# Patient Record
Sex: FEMALE | Race: WHITE | NOT HISPANIC OR LATINO | Employment: OTHER | URBAN - METROPOLITAN AREA
[De-identification: names, ages, dates, MRNs, and addresses within clinical notes are randomized per-mention and may not be internally consistent; named-entity substitution may affect disease eponyms.]

---

## 2017-03-14 DIAGNOSIS — I10 HYPERTENSION GOAL BP (BLOOD PRESSURE) < 140/90: ICD-10-CM

## 2017-03-14 NOTE — TELEPHONE ENCOUNTER
amLODIPine (NORVASC) 10 MG tablet      Last Written Prescription Date: 3-8-16  Last Fill Quantity: 90, # refills: 3    Last Office Visit with [facility] or [facility] prescribing provider:  3-8-16   Future Office Visit:        BP Readings from Last 3 Encounters:   03/08/16 106/60   09/15/15 137/63   07/21/15 123/78     quinapril (ACCUPRIL) 20 MG tablet      Last Written Prescription Date: 3-8-16  Last Fill Quantity: 45, # refills: 3  Last Office Visit with [facility] or [facility] prescribing provider: 3-8-16       Potassium   Date Value Ref Range Status   03/08/2016 4.2 3.4 - 5.3 mmol/L Final     Creatinine   Date Value Ref Range Status   03/08/2016 0.62 0.52 - 1.04 mg/dL Final     BP Readings from Last 3 Encounters:   03/08/16 106/60   09/15/15 137/63   07/21/15 123/78

## 2017-03-15 RX ORDER — AMLODIPINE BESYLATE 10 MG/1
10 TABLET ORAL DAILY
Qty: 30 TABLET | Refills: 0 | Status: SHIPPED | OUTPATIENT
Start: 2017-03-15 | End: 2017-06-13

## 2017-03-15 RX ORDER — QUINAPRIL 20 MG/1
10 TABLET ORAL DAILY
Qty: 15 TABLET | Refills: 0 | Status: SHIPPED | OUTPATIENT
Start: 2017-03-15 | End: 2017-06-13

## 2017-03-15 NOTE — TELEPHONE ENCOUNTER
Medication is being filled for 1 time refill only due to:  Patient needs to be seen because it has been more than one year since last visit.    Abby Haile RN   March 15, 2017 2:31 PM  [facility] Triage   [phone]

## 2017-06-05 ENCOUNTER — TELEPHONE (OUTPATIENT)
Dept: FAMILY MEDICINE | Facility: CLINIC | Age: 67
End: 2017-06-05

## 2017-06-05 DIAGNOSIS — Z12.31 ENCOUNTER FOR SCREENING MAMMOGRAM FOR BREAST CANCER: Primary | ICD-10-CM

## 2017-06-05 NOTE — TELEPHONE ENCOUNTER
Reason for Call: Request for an order or referral:    Order or referral being requested: mammogram    Date needed: as soon as possible      Has the patient been seen by the PCP for this problem? YES    Additional comments: Patient needs orders put in to get a mammogram    Phone number Patient can be reached at:  Home number on file [phone] (home)    Best Time:  anytime    Can we leave a detailed message on this number?  YES    Call taken on 6/5/2017 at 3:14 PM by Simona Cordova

## 2017-06-13 ENCOUNTER — TELEPHONE (OUTPATIENT)
Dept: FAMILY MEDICINE | Facility: CLINIC | Age: 67
End: 2017-06-13

## 2017-06-13 DIAGNOSIS — F41.9 ANXIETY: ICD-10-CM

## 2017-06-13 DIAGNOSIS — Z11.59 NEED FOR HEPATITIS C SCREENING TEST: ICD-10-CM

## 2017-06-13 DIAGNOSIS — Z12.11 SCREEN FOR COLON CANCER: ICD-10-CM

## 2017-06-13 DIAGNOSIS — I10 HYPERTENSION GOAL BP (BLOOD PRESSURE) < 140/90: ICD-10-CM

## 2017-06-13 DIAGNOSIS — Z13.6 CARDIOVASCULAR SCREENING; LDL GOAL LESS THAN 130: Primary | ICD-10-CM

## 2017-06-13 RX ORDER — AMLODIPINE BESYLATE 10 MG/1
TABLET ORAL
Qty: 30 TABLET | Refills: 0 | Status: SHIPPED | OUTPATIENT
Start: 2017-06-13 | End: 2017-06-16

## 2017-06-13 RX ORDER — QUINAPRIL 20 MG/1
TABLET ORAL
Qty: 15 TABLET | Refills: 0 | Status: SHIPPED | OUTPATIENT
Start: 2017-06-13 | End: 2017-06-16

## 2017-06-13 NOTE — TELEPHONE ENCOUNTER
amLODIPine (NORVASC) 10 MG tablet      Last Written Prescription Date: 3/15/17  Last Fill Quantity: 30, # refills: 0    Last Office Visit with [facility], [facility] or [facility] prescribing provider:  3/8/16   Future Office Visit:    Next 5 appointments (look out 90 days)     Jul 11, 2017 11:20 AM CDT   SHORT with Dora Fierro MD   [facility] ([facility])    [address] [address]   [phone]                    BP Readings from Last 3 Encounters:   03/08/16 106/60   09/15/15 137/63   07/21/15 123/78       quinapril (ACCUPRIL) 20 MG tablet      Last Written Prescription Date: 3/15/17  Last Fill Quantity: 15, # refills: 0  Last Office Visit with [facility], [facility] or [facility] prescribing provider: 3/8/16  Next 5 appointments (look out 90 days)     Jul 11, 2017 11:20 AM CDT   SHORT with Dora Fierro MD   [facility] ([facility])    [address] [address]   [phone]                   Potassium   Date Value Ref Range Status   03/08/2016 4.2 3.4 - 5.3 mmol/L Final     Creatinine   Date Value Ref Range Status   03/08/2016 0.62 0.52 - 1.04 mg/dL Final     BP Readings from Last 3 Encounters:   03/08/16 106/60   09/15/15 137/63   07/21/15 123/78

## 2017-06-13 NOTE — TELEPHONE ENCOUNTER
cloNIDine (CATAPRES) 0.1 MG tablet      Last Written Prescription Date: 3-8-16  Last Fill Quantity: 270, # refills: 3  Last Office Visit with [facility] or [facility] prescribing provider: 3-8-16  Next 5 appointments (look out 90 days)     Jul 11, 2017 11:20 AM CDT   SHORT with Dora Fierro MD   [facility] ([facility])    [address] [place] [address] [phone]                   Potassium   Date Value Ref Range Status   03/08/2016 4.2 3.4 - 5.3 mmol/L Final     Creatinine   Date Value Ref Range Status   03/08/2016 0.62 0.52 - 1.04 mg/dL Final     BP Readings from Last 3 Encounters:   03/08/16 106/60   09/15/15 137/63   07/21/15 123/78

## 2017-06-13 NOTE — TELEPHONE ENCOUNTER
Limited refill given.  Patient overdue for labs and follow up    I see she has 7/11 appt.  Please offer to schedule lab appt prior so we can review results at time of visit

## 2017-06-14 RX ORDER — CLONIDINE HYDROCHLORIDE 0.1 MG/1
0.1 TABLET ORAL 3 TIMES DAILY
Qty: 270 TABLET | Refills: 3 | Status: SHIPPED | OUTPATIENT
Start: 2017-06-14 | End: 2018-07-10

## 2017-06-14 NOTE — TELEPHONE ENCOUNTER
TC contacted patient and scheduled lab visit on 06/20/17. TC discussed with patient regarding changing her appointment on 07/11/17 to an annual physical as it's been awhile. Patient is willing to do this but there is not enough time to do physical at that time. Patient can reschedule that appointment if needed to later in the month but then would need additional refills prior to appointment. Patient also stated that she needs refills on her sertraline and xanax which have not been prescribed since 06/2015. TC advised patient that Dr Fierro will probably want to see her in clinic prior to refilling these medications. Routing to provider to address additional lab orders for physical and whether physical can still be done at scheduled appt on 07/11 or if patient should reschedule. Please contact patient to clarify appointment and refill information once reviewed by provider.

## 2017-06-15 NOTE — TELEPHONE ENCOUNTER
R/s her for next avail AFE .     It the math shows another month is needed for one or more of meds, then cue up please

## 2017-06-16 RX ORDER — SERTRALINE HYDROCHLORIDE 25 MG/1
25 TABLET, FILM COATED ORAL DAILY
Qty: 90 TABLET | Refills: 3 | Status: SHIPPED | OUTPATIENT
Start: 2017-06-16 | End: 2018-07-10

## 2017-06-16 RX ORDER — ALPRAZOLAM 0.25 MG
0.25 TABLET ORAL 3 TIMES DAILY PRN
Qty: 90 TABLET | Refills: 0 | Status: SHIPPED | OUTPATIENT
Start: 2017-06-16 | End: 2017-11-08

## 2017-06-16 RX ORDER — AMLODIPINE BESYLATE 10 MG/1
10 TABLET ORAL DAILY
Qty: 30 TABLET | Refills: 0 | Status: SHIPPED | OUTPATIENT
Start: 2017-06-16 | End: 2017-07-18

## 2017-06-16 RX ORDER — QUINAPRIL 20 MG/1
TABLET ORAL
Qty: 15 TABLET | Refills: 0 | Status: SHIPPED | OUTPATIENT
Start: 2017-06-16 | End: 2017-07-18

## 2017-06-16 NOTE — TELEPHONE ENCOUNTER
Patient rescheduled appointment for annual physical on 07/18/17. Provider needs to order labs needed for physical. Patient having labs drawn on 06/20/17. Patient will need refills prior to appointment.  amLODIPine (NORVASC) 10 MG tablet      Last Written Prescription Date: 06/13/2017  Last Fill Quantity: 30, # refills: 0  Last Office Visit with [email or web address] or [facility] prescribing provider: 03/08/2016  Next 5 appointments (look out 90 days)     Jul 18, 2017 11:00 AM CDT   PHYSICAL with Dora Fierro MD   [facility]    [address] [place] [address]   [phone]                   Potassium   Date Value Ref Range Status   03/08/2016 4.2 3.4 - 5.3 mmol/L Final     Creatinine   Date Value Ref Range Status   03/08/2016 0.62 0.52 - 1.04 mg/dL Final     BP Readings from Last 3 Encounters:   03/08/16 106/60   09/15/15 137/63   07/21/15 123/78     quinapril (ACCUPRIL) 20 MG tablet      Last Written Prescription Date: 06/13/2017  Last Fill Quantity: 15, # refills: 0  Last Office Visit with [facility], [email or web address] or [facility] prescribing provider: 03/08/2016  Next 5 appointments (look out 90 days)     Jul 18, 2017 11:00 AM CDT   PHYSICAL with Dora Fierro MD   [facility]    [address] [place] [address]   [phone]                   Potassium   Date Value Ref Range Status   03/08/2016 4.2 3.4 - 5.3 mmol/L Final     Creatinine   Date Value Ref Range Status   03/08/2016 0.62 0.52 - 1.04 mg/dL Final     BP Readings from Last 3 Encounters:   03/08/16 106/60   09/15/15 137/63   07/21/15 123/78     sertraline (ZOLOFT) 25 MG tablet     Last Written Prescription Date: 06/16/2015  Last Fill Quantity: 90, # refills: 3  Last Office Visit with [facility] primary care provider:  03/08/2016   Next 5 appointments (look out 90 days)     Jul 18, 2017 11:00 AM CDT   PHYSICAL  with Dora Fierro MD   [facility]    [address] [facility] [address]   [phone]                   Last PHQ-9 score on record= No flowsheet data found.    ALPRAZolam (XANAX) 0.25 MG tablet      Last Written Prescription Date:  06/16/2015  Last Fill Quantity: 90,   # refills: 3  Last Office Visit with [facility] or [facility] prescribing provider: 03/08/2016  Future Office visit:    Next 5 appointments (look out 90 days)     Jul 18, 2017 11:00 AM CDT   PHYSICAL with Dora Fierro MD   [facility] ([facility]    [address] [address]   [phone]                   Routing refill request to provider for review/approval because:  Drug not on the [facility], [facility] or [facility] refill protocol or controlled substance

## 2017-06-20 DIAGNOSIS — Z13.6 CARDIOVASCULAR SCREENING; LDL GOAL LESS THAN 130: ICD-10-CM

## 2017-06-20 DIAGNOSIS — I10 HYPERTENSION GOAL BP (BLOOD PRESSURE) < 140/90: ICD-10-CM

## 2017-06-20 DIAGNOSIS — Z11.59 NEED FOR HEPATITIS C SCREENING TEST: Primary | ICD-10-CM

## 2017-06-20 DIAGNOSIS — E78.5 HYPERLIPIDEMIA LDL GOAL <100: ICD-10-CM

## 2017-06-20 LAB
ANION GAP SERPL CALCULATED.3IONS-SCNC: 10 MMOL/L (ref 3–14)
BUN SERPL-MCNC: 10 MG/DL (ref 7–30)
CALCIUM SERPL-MCNC: 9.1 MG/DL (ref 8.5–10.1)
CHLORIDE SERPL-SCNC: 107 MMOL/L (ref 94–109)
CHOLEST SERPL-MCNC: 201 MG/DL
CO2 SERPL-SCNC: 26 MMOL/L (ref 20–32)
CREAT SERPL-MCNC: 0.62 MG/DL (ref 0.52–1.04)
GFR SERPL CREATININE-BSD FRML MDRD: NORMAL ML/MIN/1.7M2
GLUCOSE SERPL-MCNC: 88 MG/DL (ref 70–99)
HDLC SERPL-MCNC: 75 MG/DL
LDLC SERPL CALC-MCNC: 117 MG/DL
NONHDLC SERPL-MCNC: 126 MG/DL
POTASSIUM SERPL-SCNC: 3.7 MMOL/L (ref 3.4–5.3)
SODIUM SERPL-SCNC: 143 MMOL/L (ref 133–144)
TRIGL SERPL-MCNC: 44 MG/DL

## 2017-06-20 PROCEDURE — 86803 HEPATITIS C AB TEST: CPT | Performed by: INTERNAL MEDICINE

## 2017-06-20 PROCEDURE — 80048 BASIC METABOLIC PNL TOTAL CA: CPT | Performed by: INTERNAL MEDICINE

## 2017-06-20 PROCEDURE — 36415 COLL VENOUS BLD VENIPUNCTURE: CPT | Performed by: INTERNAL MEDICINE

## 2017-06-20 PROCEDURE — 80061 LIPID PANEL: CPT | Performed by: INTERNAL MEDICINE

## 2017-06-20 NOTE — LETTER
[facility]  [address] [place]  [address]  [phone]        June 21, 2017    Holly Reina  [address]   [place] [address]        Dear Holly,    Enclosed are your results.  Your labs are normal/stable at this time.     Please call  with any questions.  We can also discuss any questions regarding these labs at your next scheduled visit.    Results for orders placed or performed in visit on 06/20/17   Hepatitis C antibody   Result Value Ref Range    Hepatitis C Antibody  NR     Nonreactive   Assay performance characteristics have not been established for newborns,   infants, and children     **Lipid panel reflex to direct LDL FUTURE anytime   Result Value Ref Range    Cholesterol 201 (H) <200 mg/dL    Triglycerides 44 <150 mg/dL    HDL Cholesterol 75 >49 mg/dL    LDL Cholesterol Calculated 117 (H) <100 mg/dL    Non HDL Cholesterol 126 <130 mg/dL   Basic metabolic panel   Result Value Ref Range    Sodium 143 133 - 144 mmol/L    Potassium 3.7 3.4 - 5.3 mmol/L    Chloride 107 94 - 109 mmol/L    Carbon Dioxide 26 20 - 32 mmol/L    Anion Gap 10 3 - 14 mmol/L    Glucose 88 70 - 99 mg/dL    Urea Nitrogen 10 7 - 30 mg/dL    Creatinine 0.62 0.52 - 1.04 mg/dL    GFR Estimate >90  Non  GFR Calc   >60 mL/min/1.7m2    GFR Estimate If Black >90   GFR Calc   >60 mL/min/1.7m2    Calcium 9.1 8.5 - 10.1 mg/dL       If you have any questions please call the clinic at [phone].    Sincerely,    Dora MCALLISTER

## 2017-06-21 LAB — HCV AB SERPL QL IA: NORMAL

## 2017-06-21 NOTE — PROGRESS NOTES
Holly Gomez are your results.  Your labs are normal/stable at this time.     Please call  with any questions.  We can also discuss any questions regarding these labs at your next scheduled visit.    Sincerely,    Dora Fierro M.D.

## 2017-07-13 ENCOUNTER — TELEPHONE (OUTPATIENT)
Dept: FAMILY MEDICINE | Facility: CLINIC | Age: 67
End: 2017-07-13

## 2017-07-13 NOTE — TELEPHONE ENCOUNTER
----- Message from Adam Bermudez sent at 7/13/2017  4:59 PM CDT -----  Regarding: denial  DOS 6/20/17 Coding [place] [id number] being done for screening to frequently. Last one on in March. In reviewing the March result it appears the pt had an elevated test. Pehaps that is why the test was repeated so soon. If yes the dx should be changed to a dx for high lipid or abnormal lab result and insurance would likely cover this lab. Can you review the lab and if anything can be changed have this corrected in documentation and order. If you have any questions in regards to this please let me know.

## 2017-07-18 ENCOUNTER — OFFICE VISIT (OUTPATIENT)
Dept: FAMILY MEDICINE | Facility: CLINIC | Age: 67
End: 2017-07-18
Payer: COMMERCIAL

## 2017-07-18 VITALS
HEART RATE: 65 BPM | HEIGHT: 61 IN | BODY MASS INDEX: 18.12 KG/M2 | WEIGHT: 96 LBS | DIASTOLIC BLOOD PRESSURE: 63 MMHG | OXYGEN SATURATION: 98 % | SYSTOLIC BLOOD PRESSURE: 114 MMHG | TEMPERATURE: 97.9 F

## 2017-07-18 DIAGNOSIS — M41.25 OTHER IDIOPATHIC SCOLIOSIS, THORACOLUMBAR REGION: ICD-10-CM

## 2017-07-18 DIAGNOSIS — R42 INTERMITTENT VERTIGO: ICD-10-CM

## 2017-07-18 DIAGNOSIS — Z00.00 ROUTINE GENERAL MEDICAL EXAMINATION AT A HEALTH CARE FACILITY: Primary | ICD-10-CM

## 2017-07-18 DIAGNOSIS — M81.0 AGE-RELATED OSTEOPOROSIS WITHOUT CURRENT PATHOLOGICAL FRACTURE: ICD-10-CM

## 2017-07-18 DIAGNOSIS — M81.0 OSTEOPOROSIS WITHOUT CURRENT PATHOLOGICAL FRACTURE, UNSPECIFIED OSTEOPOROSIS TYPE: ICD-10-CM

## 2017-07-18 DIAGNOSIS — M48.061 SPINAL STENOSIS OF LUMBAR REGION: ICD-10-CM

## 2017-07-18 DIAGNOSIS — Z12.11 SCREEN FOR COLON CANCER: ICD-10-CM

## 2017-07-18 DIAGNOSIS — Z12.31 VISIT FOR SCREENING MAMMOGRAM: ICD-10-CM

## 2017-07-18 DIAGNOSIS — J45.20 MILD INTERMITTENT ASTHMA WITHOUT COMPLICATION: ICD-10-CM

## 2017-07-18 DIAGNOSIS — I10 HYPERTENSION GOAL BP (BLOOD PRESSURE) < 140/90: ICD-10-CM

## 2017-07-18 DIAGNOSIS — Q35.9 CLEFT PALATE: ICD-10-CM

## 2017-07-18 DIAGNOSIS — M26.04 MANDIBULAR MICROGNATHIA: ICD-10-CM

## 2017-07-18 DIAGNOSIS — G57.00 LESION OF SCIATIC NERVE, UNSPECIFIED LATERALITY: ICD-10-CM

## 2017-07-18 PROCEDURE — 99397 PER PM REEVAL EST PAT 65+ YR: CPT | Performed by: INTERNAL MEDICINE

## 2017-07-18 PROCEDURE — 99213 OFFICE O/P EST LOW 20 MIN: CPT | Mod: 25 | Performed by: INTERNAL MEDICINE

## 2017-07-18 RX ORDER — ALENDRONATE SODIUM 70 MG/1
70 TABLET ORAL
Qty: 12 TABLET | Refills: 3 | Status: SHIPPED | OUTPATIENT
Start: 2017-07-18 | End: 2019-10-15

## 2017-07-18 RX ORDER — QUINAPRIL 20 MG/1
TABLET ORAL
Qty: 45 TABLET | Refills: 3 | Status: SHIPPED | OUTPATIENT
Start: 2017-07-18 | End: 2018-12-11

## 2017-07-18 RX ORDER — MECLIZINE HYDROCHLORIDE 25 MG/1
25 TABLET ORAL EVERY 6 HOURS PRN
Qty: 24 TABLET | Refills: 1 | Status: SHIPPED | OUTPATIENT
Start: 2017-07-18 | End: 2018-07-10

## 2017-07-18 RX ORDER — AMLODIPINE BESYLATE 10 MG/1
10 TABLET ORAL DAILY
Qty: 90 TABLET | Refills: 3 | Status: SHIPPED | OUTPATIENT
Start: 2017-07-18 | End: 2018-08-28

## 2017-07-18 NOTE — PROGRESS NOTES
SUBJECTIVE:   Holly Reina is a 66 year old female who presents for Preventive Visit.    65 y/o F with anxiety, cleft palate. mild intermittent asthma here for routing AFE.   Recent labs look good. Normal BMP and cholesterol WNL.   Never did start fosamax  Are you in the first 12 months of your Medicare Part B coverage?  No    Healthy Habits:    Do you get at least three servings of calcium containing foods daily (dairy, green leafy vegetables, etc.)? yes    Amount of exercise or daily activities, outside of work: 0 day(s) per week    Problems taking medications regularly No    Medication side effects: No    Have you had an eye exam in the past two years? no    Do you see a dentist twice per year? no    Do you have sleep apnea, excessive snoring or daytime drowsiness?yes-daytime drowsiness    COGNITIVE SCREEN  1) Repeat 3 items (Banana, Sunrise, Chair)    2) Clock draw: NORMAL  3) 3 item recall: Recalls 2 objects   Results: NORMAL clock, 1-2 items recalled: COGNITIVE IMPAIRMENT LESS LIKELY    Mini-CogTM Copyright S Mikki. Licensed by the author for use in [facility]; reprinted with permission (savage@[facility]). All rights reserved.            Letter that states she should live in a building with an elevator, so she can brake her lease  Anxieyy    Reviewed and updated as needed this visit by clinical staff  Tobacco  Allergies  Med Hx  Surg Hx  Fam Hx  Soc Hx        Reviewed and updated as needed this visit by Provider        Social History   Substance Use Topics     Smoking status: Former Smoker     Smokeless tobacco: Never Used     Alcohol use Yes      Comment: seldom       The patient does not drink >3 drinks per day nor >7 drinks per week.    Today's PHQ-2 Score:   PHQ-2 ( 1999 [employer]) 3/8/2016 9/15/2015   Q1: Little interest or pleasure in doing things 0 1   Q2: Feeling down, depressed or hopeless 0 1   PHQ-2 Score 0 2     PHQ 2 = 0  Do you feel safe in your environment - Yes    Do you have a  Health Care Directive?: No: Advance care planning reviewed with patient; information given to patient to review.    Current providers sharing in care for this patient include:   Patient Care Team:  Dora Fierro MD as PCP - General      Hearing impairment: No    Ability to successfully perform activities of daily living: No, needs assistance with: housework     Fall risk:       Home safety:  lack of grab bars in the bathroom      The following health maintenance items are reviewed in Epic and correct as of today:  Health Maintenance   Topic Date Due     PNEUMOCOCCAL (2 of 2 - PPSV23) 06/16/2016     FALL RISK ASSESSMENT  07/21/2016     ADVANCE DIRECTIVE PLANNING Q5 YRS  07/25/2016     ASTHMA CONTROL TEST Q6 MOS  09/08/2016     MAMMO SCREEN Q2 YR (SYSTEM ASSIGNED)  02/03/2017     ASTHMA ACTION PLAN Q1 YR  03/08/2017     FIT Q1 YR  03/15/2017     INFLUENZA VACCINE (SYSTEM ASSIGNED)  09/01/2017     BMP Q1 YR  06/20/2018     LIPID MONITORING Q1 YEAR  06/20/2018     WELLNESS VISIT Q1 YR  07/18/2018     TETANUS IMMUNIZATION (SYSTEM ASSIGNED)  07/25/2021     DEXA SCAN SCREENING (SYSTEM ASSIGNED)  Completed     HEPATITIS C SCREENING  Completed     Labs reviewed in EPIC  BP Readings from Last 3 Encounters:   07/18/17 114/63   03/08/16 106/60   09/15/15 137/63    Wt Readings from Last 3 Encounters:   07/18/17 96 lb (43.5 kg)   03/08/16 96 lb (43.5 kg)   12/22/15 90 lb (40.8 kg)                  Patient Active Problem List   Diagnosis     CARDIOVASCULAR SCREENING; LDL GOAL LESS THAN 130     Atypical angina (H)     Hypertension goal BP (blood pressure) < 140/90     ASCUS on Pap smear     Advanced directives, counseling/discussion     Scoliosis deformity of spine     Spinal stenosis     Anxiety     Osteoporosis     Gastroesophageal reflux disease without esophagitis     Fracture of distal end of radius and ulna, right, closed, with routine healing, subsequent encounter     Mild intermittent asthma without complication      Past Surgical History:   Procedure Laterality Date     C JACOB NOSE/CLEFT LIP/TIP  childhood     DECOMPRESSION LUMBAR ONE LEVEL  2002    l4/5  -- Dr Mckinley     HC DILATION/CURETTAGE DIAG/THER NON OB       HYSTERECTOMY, CERVIX STATUS UNKNOWN         Social History   Substance Use Topics     Smoking status: Former Smoker     Smokeless tobacco: Never Used     Alcohol use Yes      Comment: seldom     Family History   Problem Relation Age of Onset     HEART DISEASE Mother      Hypertension Mother      HEART DISEASE Father      Hypertension Father          Current Outpatient Prescriptions   Medication Sig Dispense Refill     amLODIPine (NORVASC) 10 MG tablet Take 1 tablet (10 mg) by mouth daily 30 tablet 0     quinapril (ACCUPRIL) 20 MG tablet TAKE ONE-HALF TABLET BY MOUTH DAILY 15 tablet 0     sertraline (ZOLOFT) 25 MG tablet Take 1 tablet (25 mg) by mouth daily 1 tablet.qd 90 tablet 3     ALPRAZolam (XANAX) 0.25 MG tablet Take 1 tablet (0.25 mg) by mouth 3 times daily as needed for anxiety 90 tablet 0     cloNIDine (CATAPRES) 0.1 MG tablet Take 1 tablet (0.1 mg) by mouth 3 times daily 270 tablet 3     atenolol (TENORMIN) 50 MG tablet Take 1 tablet (50 mg) by mouth daily 90 tablet 3     aspirin 81 MG tablet Take 81 mg by mouth daily       loratadine 10 MG capsule Take 10 mg by mouth daily 90 capsule 3     meclizine (ANTIVERT) 25 MG tablet Take 1 tablet (25 mg) by mouth every 6 hours as needed 24 tablet 1     mometasone (NASONEX) 50 MCG/ACT nasal spray Spray 2 sprays into both nostrils daily. 3 Box 3     calcium carbonate (TUMS E-X 750) 750 MG CHEW Take 750 mg by mouth daily.       Multiple Vitamins-Minerals (ONE-A-DAY WOMENS 50 PLUS PO) Take  by mouth daily.       STATIN NOT PRESCRIBED, INTENTIONAL, Please choose reason not prescribed, below       alendronate (FOSAMAX) 70 MG tablet Take 1 tablet (70 mg) by mouth every 7 days Take with over 8 ounces water and stay upright for at least 30 minutes after dose.  Take at  least 60 minutes before breakfast (Patient not taking: Reported on 7/18/2017) 12 tablet 3     ibuprofen (ADVIL,MOTRIN) 200 MG tablet Take 1 tablet by mouth every 4 hours as needed for pain. (Patient not taking: Reported on 7/18/2017) 60 tablet 0     albuterol 90 MCG/ACT inhaler Inhale 1-2 puffs into the lungs every 6 hours as needed for shortness of breath / dyspnea. (Patient not taking: Reported on 7/18/2017) 1 Inhaler 4     Allergies   Allergen Reactions     Citalopram Other (See Comments)     Dry mouth, pressures in chest  And nose congestion. Patient also had light headed and nausea     Penicillins      Pravastatin      Aches.      Triamterene-Hctz [Hydrochlorothiazide W/Triamterene]      Recent Labs   Lab Test  06/20/17   1105  03/08/16   1104   06/16/15   1133   06/20/14   1227   09/20/12   1034   09/22/11   1109   LDL  117*  116*   --   94   --   120   --   128   --   125   HDL  75  79   --   70   --   56   --   69   --   54   TRIG  44  53   --   68   --   52   --   64   --   74   ALT   --    --    --    --    --    --    --   17   --   19   CR  0.62  0.62   < >  0.60   < >  0.60   < >  0.54   < >   --    GFRESTIMATED  >90  Non  GFR Calc    >90  Non  GFR Calc     < >  >90  Non  GFR Calc     < >  >90   < >  >90   < >   --    GFRESTBLACK  >90   GFR Calc    >90   GFR Calc     < >  >90   GFR Calc     < >  >90   < >  >90   < >   --    POTASSIUM  3.7  4.2   < >  3.9   < >  3.9   < >  4.4   < >   --    TSH   --   1.07   --    --    --   1.30   < >   --    < >   --     < > = values in this interval not displayed.             ROS:  Constitutional (no longer depressed, feels she is taking control of her life), HEENT, cardiovascular, pulmonary, GI, , musculoskeletal, neuro, skin, endocrine and psych systems are negative, except as otherwise noted.    OBJECTIVE:   /63 (BP Location: Right arm, Patient Position: Chair,  "Cuff Size: Adult Small)  Pulse 65  Temp 97.9  F (36.6  C) (Oral)  Ht 5' 0.5\" (1.537 m)  Wt 96 lb (43.5 kg)  SpO2 98%  BMI 18.44 kg/m2 Estimated body mass index is 18.44 kg/(m^2) as calculated from the following:    Height as of this encounter: 5' 0.5\" (1.537 m).    Weight as of this encounter: 96 lb (43.5 kg).  EXAM:   GENERAL APPEARANCE: healthy, alert and no distress  EYES: Eyes grossly normal to inspection, PERRL and conjunctivae and sclerae normal  HENT: ear canals and TM's normal, nose and mouth without ulcers or lesions, oropharynx clear and oral mucous membranes moist  HENT: micrognathia and midline cleft palate.  Nasal, resonant voice.  Fair dentition.   NECK: no adenopathy, no asymmetry, masses, or scars and thyroid normal to palpation  RESP: lungs clear to auscultation - no rales, rhonchi or wheezes  BREAST: normal without masses, tenderness or nipple discharge and no palpable axillary masses or adenopathy  CV: regular rate and rhythm, normal S1 S2, no S3 or S4, no murmur, click or rub, no peripheral edema and peripheral pulses strong  ABDOMEN: soft, nontender, no hepatosplenomegaly, no masses and bowel sounds normal   (female): normal female external genitalia, normal urethral meatus and no bimanual done  MS: no musculoskeletal defects are noted and gait is age appropriate without ataxia.  Lower lumbar scar from h/o lumbar surgery  SKIN: no suspicious lesions or rashes  NEURO: Normal strength and tone, sensory exam grossly normal, mentation intact and speech normal.  Slight positive straight leg raise on left.  Slight weakness of toe dorsiflexion on left.  This is stable   PSYCH: mentation appears normal and affect normal/bright.  Pressured speech.  Positive outlook.     ASSESSMENT / PLAN:       ICD-10-CM    1. Routine general medical examination at a health care facility Z00.00    2. Mild intermittent asthma without complication J45.20    3. Age-related osteoporosis without current pathological " "fracture M81.0    4. Other idiopathic scoliosis, thoracolumbar region M41.25    5. Spinal stenosis of lumbar region M48.06    6. Hypertension goal BP (blood pressure) < 140/90 I10 amLODIPine (NORVASC) 10 MG tablet     quinapril (ACCUPRIL) 20 MG tablet   7. 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 Cleft palate Q35.9    9. Screen for colon cancer Z12.11    10. Visit for screening mammogram Z12.31 MA SCREENING DIGITAL BILAT - Future  (s+30)   11. Osteoporosis without current pathological fracture, unspecified osteoporosis type M81.0 alendronate (FOSAMAX) 70 MG tablet   12. Intermittent vertigo R42 meclizine (ANTIVERT) 25 MG tablet     [facility]   13. Lesion of sciatic nerve, unspecified laterality G57.00 MR Lumbar Spine w/o Contrast     [facility]          Patient needs multidisciplinary support/treatment for her sciatica...   Be careful with any sedating medications.  She may respond to therapy and / or injections.  Would ask pain colleagues to refer her to neurosurgery when/if appropriate in future.     Letter composed and  printed for her landlord, she may have to break her current lease. She is moving to a senior building soon.     Asthma well controlled.          End of Life Planning:  Patient currently has an advanced directive: No.  I have verified the patient's ablity to prepare an advanced directive/make health care decisions.  Literature was provided to assist patient in preparing an advanced directive.    COUNSELING:  Reviewed preventive health counseling, as reflected in patient instructions       Advanced Planning         Estimated body mass index is 18.44 kg/(m^2) as calculated from the following:    Height as of this encounter: 5' 0.5\" (1.537 m).    Weight as of this encounter: 96 lb (43.5 kg).  Weight management plan noted, stable and monitoring   reports that she has quit smoking. She has never used smokeless tobacco.      Appropriate " preventive services were discussed with this patient, including applicable screening as appropriate for cardiovascular disease, diabetes, osteopenia/osteoporosis, and glaucoma.  As appropriate for age/gender, discussed screening for colorectal cancer, prostate cancer, breast cancer, and cervical cancer. Checklist reviewing preventive services available has been given to the patient.    Reviewed patients plan of care and provided an AVS. The Basic Care Plan (routine screening as documented in Health Maintenance) for Holly meets the Care Plan requirement. This Care Plan has been established and reviewed with the Patient.    Counseling Resources:  ATP IV Guidelines  Pooled Cohorts Equation Calculator  Breast Cancer Risk Calculator  FRAX Risk Assessment  ICSI Preventive Guidelines  Dietary Guidelines for Americans, 2010  USDA's MyPlate  ASA Prophylaxis  Lung CA Screening    Dora Fierro MD  [facility]

## 2017-07-18 NOTE — LETTER
[address] [address] [phone]      July 18, 2017      RE:   Holly SHELTON Super  [address]   [place] [address]      To Whom It May Concern,       Due to mobility problems Holly must live in a building with an elevator is she is living above the main floor.       Your understanding is appreciated.         Sincerely,        SANTIAGO MORLEY M.D.  [phone] ()  [phone] (fax)

## 2017-07-18 NOTE — PATIENT INSTRUCTIONS

## 2017-07-18 NOTE — MR AVS SNAPSHOT
After Visit Summary   7/18/2017    Holly Reina    MRN: [id number]           Patient Information     Date Of Birth          1950        Visit Information        Provider Department      7/18/2017 11:00 AM Dora Fierro MD [facility]        Today's Diagnoses     Routine general medical examination at a health care facility    -  1    Mild intermittent asthma without complication        Age-related osteoporosis without current pathological fracture        Other idiopathic scoliosis, thoracolumbar region        Spinal stenosis of lumbar region        Hypertension goal BP (blood pressure) < 140/90        Mandibular micrognathia        Cleft palate        Screen for colon cancer        Visit for screening mammogram        Osteoporosis without current pathological fracture, unspecified osteoporosis type        Intermittent vertigo        Lesion of sciatic nerve, unspecified laterality          Care Instructions      Preventive Health Recommendations    Female Ages 65 +    Yearly exam:     See your health care provider every year in order to  o Review health changes.   o Discuss preventive care.    o Review your medicines if your doctor has prescribed any.      You no longer need a yearly Pap test unless you've had an abnormal Pap test in the past 10 years. If you have vaginal symptoms, such as bleeding or discharge, be sure to talk with your provider about a Pap test.      Every 1 to 2 years, have a mammogram.  If you are over 69, talk with your health care provider about whether or not you want to continue having screening mammograms.      Every 10 years, have a colonoscopy. Or, have a yearly FIT test (stool test). These exams will check for colon cancer.       Have a cholesterol test every 5 years, or more often if your doctor advises it.       Have a diabetes test (fasting glucose) every three years. If you are at risk for diabetes, you should have this test more  often.       At age 65, have a bone density scan (DEXA) to check for osteoporosis (brittle bone disease).    Shots:    Get a flu shot each year.    Get a tetanus shot every 10 years.    Talk to your doctor about your pneumonia vaccines. There are now two you should receive - Pneumovax (PPSV 23) and Prevnar (PCV 13).    Talk to your doctor about the shingles vaccine.    Talk to your doctor about the hepatitis B vaccine.    Nutrition:     Eat at least 5 servings of fruits and vegetables each day.      Eat whole-grain bread, whole-wheat pasta and brown rice instead of white grains and rice.      Talk to your provider about Calcium and Vitamin D.     Lifestyle    Exercise at least 150 minutes a week (30 minutes a day, 5 days a week). This will help you control your weight and prevent disease.      Limit alcohol to one drink per day.      No smoking.       Wear sunscreen to prevent skin cancer.       See your dentist twice a year for an exam and cleaning.      See your eye doctor every 1 to 2 years to screen for conditions such as glaucoma, macular degeneration and cataracts.      Start the Fosamax once weekly (to reduce fracture risk)    mammogram    Health Care Directive -- we would like one on file    Call to schedule imaging   :   MRI back.  This will help pain clinic plan treatments.     Pain clinic will call you    Return to clinic 3-6 months (Fall 2017)          Follow-ups after your visit        Additional Services     PAIN MANAGEMENT CENTER ([place]) REFERRAL       Your provider has referred you to the [facility].    Reason for Referral: Comprehensive Evaluation and Management    Please complete the following questions:    What is your diagnosis for the patient's pain? Sciatica.  Patient needs multidisciplinary support/treatment.  Be careful with any sedating medications.  She may respond to therapy and / or injections.  Please refer to neurosurgery when/if appropriate in  future.     Do you have any specific questions for the pain specialist? No    Are there any red flags that may impact the assessment or management of the patient? None    **ANY DIAGNOSTIC TESTS THAT ARE NOT IN EPIC SHOULD BE SENT TO THE PAIN CENTER**    Please note the Pre-Op Pain Consult must be scheduled 2-3 weeks prior to the patient's surgery.  Patient's trying to schedule within 2 weeks of surgery may not be accommodated.     Pre-Op Pain Consults are only good for 30 days.    REGARDING OPIOID MEDICATIONS:  We will always address appropriateness of opioid pain medications, but we generally will not automatically take on a prescribing role. When we do take on prescribing of opioids for chronic pain, it is in collaboration with the referring physician for an intermediate period of time (months), with an expectation that the primary physician or provider will assume the prescribing role if medications are effective at stable doses with demonstrated compliance.  Therefore, please do not assume that your prescribing responsibilities end on the day of pain clinic consultation.  Is this agreeable to you? YES    For any questions, contact the [facility] at [phone].    Please be aware that coverage of these services is subject to the terms and limitations of your health insurance plan.  Call member services at your health plan with any benefit or coverage questions.      Please bring the following with you to your appointment:    (1) Any X-Rays, CTs or MRIs which have been performed.  Contact the facility where they were done to arrange for  prior to your scheduled appointment.    (2) List of current medications   (3) This referral request   (4) Any documents/labs given to you for this referral                  Follow-up notes from your care team     Return in about 4 months (around 11/18/2017) for Routine Visit.      Your next 10 appointments already scheduled     Jul 31, 2017  2:30 PM  "CDT   MA SCREENING DIGITAL BILATERAL with [id number]   [facility]    [address] [place] [address]  [place] [address] [phone]           Do not use any powder, lotion or deodorant under your arms or on your breast. If you do, we will ask you to remove it before your exam.  Wear comfortable, two-piece clothing.  If you have any allergies, tell your care team.  Bring any previous mammograms from other facilities or have them mailed to the breast center.              Future tests that were ordered for you today     Open Future Orders        Priority Expected Expires Ordered    Fecal colorectal cancer screen (FIT) Routine 8/8/2017 10/10/2017 7/18/2017    MA SCREENING DIGITAL BILAT - Future  (s+30) Routine  7/18/2018 7/18/2017    MR Lumbar Spine w/o Contrast Routine  7/18/2018 7/18/2017            Who to contact     If you have questions or need follow up information about today's clinic visit or your schedule please contact [facility] directly at [phone].  Normal or non-critical lab and imaging results will be communicated to you by [employer] System[employer]art, letter or phone within 4 business days after the clinic has received the results. If you do not hear from us within 7 days, please contact the clinic through [employer] System[employer] or phone. If you have a critical or abnormal lab result, we will notify you by phone as soon as possible.  Submit refill requests through [employer] or call your pharmacy and they will forward the refill request to us. Please allow 3 business days for your refill to be completed.          Additional Information About Your Visit        Opendisc Information     Opendisc lets you send messages to your doctor, view your test results, renew your prescriptions, schedule appointments and more. To sign up, go to [email or web address] . Click on \"Log in\" on the left side of the screen, which will take you to the Welcome page. Then click on \"Sign up Now\" on the " "right side of the page.     You will be asked to enter the access code listed below, as well as some personal information. Please follow the directions to create your username and password.     Your access code is: [email or web address]  Expires: 10/16/2017 12:22 PM     Your access code will  in 90 days. If you need help or a new code, please call your [facility] clinic or [phone].        Care EveryWhere ID     This is your Care EveryWhere ID. This could be used by other organizations to access your [facility] medical records  [id number]        Your Vitals Were     Pulse Temperature Height Pulse Oximetry BMI (Body Mass Index)       65 97.9  F (36.6  C) (Oral) 5' 0.5\" (1.537 m) 98% 18.44 kg/m2        Blood Pressure from Last 3 Encounters:   17 114/63   16 106/60   09/15/15 137/63    Weight from Last 3 Encounters:   17 96 lb (43.5 kg)   16 96 lb (43.5 kg)   12/22/15 90 lb (40.8 kg)              We Performed the Following     Asthma Action Plan (AAP)     OFFICE/OUTPT VISIT,ELSA SANTIAGO IV     PAIN MANAGEMENT CENTER ([place]) REFERRAL          Where to get your medicines      These medications were sent to [facility] [address] [place], [place] [address] [facility] [place] [address] [facility] [address]     Phone:  [phone]     amLODIPine 10 MG tablet    meclizine 25 MG tablet    quinapril 20 MG tablet         Some of these will need a paper prescription and others can be bought over the counter.  Ask your nurse if you have questions.     Bring a paper prescription for each of these medications     alendronate 70 MG tablet          Primary Care Provider Office Phone # Fax #    Dora Fierro [phone]       [facility] [address] [facility] [place] [address]        Equal Access to Services     PADMINI RALPH AH: Yana Steiner, joseph snyder, divya samsonarash " lanick barker. So [facility] [phone].    ATENCIÓN: Si habla isela, tiene a amezquita disposición servicios gratuitos de asistencia lingüística. Clive shaffer [phone].    We comply with applicable federal civil rights laws and [place] laws. We do not discriminate on the basis of race, color, national origin, age, disability sex, sexual orientation or gender identity.            Thank you!     Thank you for choosing [facility]  for your care. Our goal is always to provide you with excellent care. Hearing back from our patients is one way we can continue to improve our services. Please take a few minutes to complete the written survey that you may receive in the mail after your visit with us. Thank you!             Your Updated Medication List - Protect others around you: Learn how to safely use, store and throw away your medicines at www.disposemymeds.org.          This list is accurate as of: 7/18/17 12:34 PM.  Always use your most recent med list.                   Brand Name Dispense Instructions for use Diagnosis    albuterol 90 MCG/ACT inhaler     1 Inhaler    Inhale 1-2 puffs into the lungs every 6 hours as needed for shortness of breath / dyspnea.    Allergies       alendronate 70 MG tablet    FOSAMAX    12 tablet    Take 1 tablet (70 mg) by mouth every 7 days Take with over 8 ounces water and stay upright for at least 30 minutes after dose.  Take at least 60 minutes before breakfast    Osteoporosis without current pathological fracture, unspecified osteoporosis type       ALPRAZolam 0.25 MG tablet    XANAX    90 tablet    Take 1 tablet (0.25 mg) by mouth 3 times daily as needed for anxiety    Anxiety       amLODIPine 10 MG tablet    NORVASC    90 tablet    Take 1 tablet (10 mg) by mouth daily    Hypertension goal BP (blood pressure) < 140/90       aspirin 81 MG tablet      Take 81 mg by mouth daily        atenolol 50 MG tablet    TENORMIN    90 tablet    Take 1 tablet (50 mg) by mouth daily     Atypical angina (H)       cloNIDine 0.1 MG tablet    CATAPRES    270 tablet    Take 1 tablet (0.1 mg) by mouth 3 times daily    Hypertension goal BP (blood pressure) < 140/90       ibuprofen 200 MG tablet    ADVIL/MOTRIN    60 tablet    Take 1 tablet by mouth every 4 hours as needed for pain.    Scoliosis deformity of spine, Spinal stenosis       loratadine 10 MG capsule     90 capsule    Take 10 mg by mouth daily    Chronic rhinitis       meclizine 25 MG tablet    ANTIVERT    24 tablet    Take 1 tablet (25 mg) by mouth every 6 hours as needed    Intermittent vertigo       mometasone 50 MCG/ACT spray    NASONEX    3 Box    Spray 2 sprays into both nostrils daily.    Allergic rhinitis due to other allergen       ONE-A-DAY WOMENS 50 PLUS PO      Take  by mouth daily.    Routine general medical examination at a health care facility       quinapril 20 MG tablet    ACCUPRIL    45 tablet    TAKE ONE-HALF TABLET BY MOUTH DAILY    Hypertension goal BP (blood pressure) < 140/90       sertraline 25 MG tablet    ZOLOFT    90 tablet    Take 1 tablet (25 mg) by mouth daily 1 tablet.qd    Anxiety       STATIN NOT PRESCRIBED (INTENTIONAL)      Please choose reason not prescribed, below    CARDIOVASCULAR SCREENING; LDL GOAL LESS THAN 130       TUMS E-X 750 750 MG Chew   Generic drug:  calcium carbonate      Take 750 mg by mouth daily.

## 2017-07-18 NOTE — NURSING NOTE
"Chief Complaint   Patient presents with     Physical     Health Maintenance     FIT,aap,act,fall risk,mammo       Initial /63 (BP Location: Right arm, Patient Position: Chair, Cuff Size: Adult Small)  Pulse 65  Temp 97.9  F (36.6  C) (Oral)  Ht 5' 0.5\" (1.537 m)  Wt 96 lb (43.5 kg)  SpO2 98%  BMI 18.44 kg/m2 Estimated body mass index is 18.44 kg/(m^2) as calculated from the following:    Height as of this encounter: 5' 0.5\" (1.537 m).    Weight as of this encounter: 96 lb (43.5 kg).  Medication Reconciliation: complete   Candice Gomes ma      "

## 2017-07-19 ASSESSMENT — ASTHMA QUESTIONNAIRES: ACT_TOTALSCORE: 25

## 2017-07-27 ENCOUNTER — TELEPHONE (OUTPATIENT)
Dept: FAMILY MEDICINE | Facility: CLINIC | Age: 67
End: 2017-07-27

## 2017-07-27 NOTE — LETTER
[address] [id number] [phone]          July 27, 2017    RE:  Holly SHELTON Latosha                                                                                                                     [address]   [facility] [address]        To Whom It May Concern,         Holly's cats are needed as companions.      Your understanding is appreciated.         Sincerely,         Dora Fierro MD  [phone] ()  [phone] (fax)

## 2017-07-27 NOTE — TELEPHONE ENCOUNTER
TC contacted patient and informed letter was completed. Letter mailed to patient's home address per patient request.

## 2017-07-31 ENCOUNTER — TELEPHONE (OUTPATIENT)
Dept: FAMILY MEDICINE | Facility: CLINIC | Age: 67
End: 2017-07-31

## 2017-07-31 ENCOUNTER — RADIANT APPOINTMENT (OUTPATIENT)
Dept: MAMMOGRAPHY | Facility: CLINIC | Age: 67
End: 2017-07-31
Attending: INTERNAL MEDICINE
Payer: COMMERCIAL

## 2017-07-31 DIAGNOSIS — Z12.31 VISIT FOR SCREENING MAMMOGRAM: ICD-10-CM

## 2017-07-31 PROCEDURE — G0202 SCR MAMMO BI INCL CAD: HCPCS | Mod: TC

## 2017-07-31 NOTE — TELEPHONE ENCOUNTER
Forms received from: [employer]   Phone number listed: [phone]   Fax listed: [phone]  Date received: 07/31/2017  Form description: Verification-Need for  Animal  Once forms are completed, please return to [employer] via fax.  Is patient requesting to be contacted when forms are completed: NA    Form placed: In provider's basket  Silvia Linares

## 2017-08-16 ENCOUNTER — TELEPHONE (OUTPATIENT)
Dept: FAMILY MEDICINE | Facility: CLINIC | Age: 67
End: 2017-08-16

## 2017-08-16 NOTE — TELEPHONE ENCOUNTER
Panel Management Review      Patient has the following on her problem list:       Composite cancer screening  Chart review shows that this patient is due/due soon for the following Fecal Colorectal (FIT)  Summary:    Patient is due/failing the following:   FIT    Action needed:   FIT test was given on 7/18/17    Type of outreach:    None, patient was given a FIT test on 7/18/17    Questions for provider review:    None                                                                                                                                    Candice Gomes ma       Chart routed to closing chart .

## 2017-11-08 DIAGNOSIS — F41.9 ANXIETY: ICD-10-CM

## 2017-11-08 DIAGNOSIS — I20.89 ATYPICAL ANGINA (H): ICD-10-CM

## 2017-11-08 NOTE — TELEPHONE ENCOUNTER
ALPRAZolam (XANAX) 0.25 MG tablet      Last Written Prescription Date:  6-16-17  Last Fill Quantity: 90,   # refills: 0  Future Office visit:       Routing refill request to provider for review/approval because:  Drug not on the [facility], P or [facility] refill protocol or controlled substance

## 2017-11-09 RX ORDER — ALPRAZOLAM 0.25 MG
0.25 TABLET ORAL 3 TIMES DAILY PRN
Qty: 90 TABLET | Refills: 0 | Status: SHIPPED | OUTPATIENT
Start: 2017-11-09 | End: 2018-04-03

## 2017-11-10 RX ORDER — ATENOLOL 50 MG/1
50 TABLET ORAL DAILY
Qty: 90 TABLET | Refills: 3 | Status: SHIPPED | OUTPATIENT
Start: 2017-11-10 | End: 2018-12-11

## 2017-11-10 NOTE — TELEPHONE ENCOUNTER
Prescription approved per [facility] Refill Protocol.Beatriz Avendano, RN-BSN

## 2017-11-13 ENCOUNTER — TELEPHONE (OUTPATIENT)
Dept: FAMILY MEDICINE | Facility: CLINIC | Age: 67
End: 2017-11-13

## 2017-11-13 NOTE — TELEPHONE ENCOUNTER
Panel Management Review      Patient has the following on her problem list:       Composite cancer screening  Chart review shows that this patient is due/due soon for the following Fecal Colorectal (FIT)  Summary:    Patient is due/failing the following:   FIT    Action needed:   Due for a FIT test    Type of outreach:    Sent letter.    Questions for provider review:    None                                                                                                                                    Candice Gomes ma       Chart routed to Care Team .

## 2017-11-13 NOTE — LETTER
November 13, 2017    Holly Reina  [address]   [place] [address]    Dear Holly    We care about your health and have reviewed your health plan. We have reviewed your medical conditions, medication list, and lab results and are making recommendations based on this review, to better manage your health.    You are in particular need of attention regarding:  - Completing a Colon Cancer Screening (FIT) - Please complete FIT test a test to screen for colon cancer and mail completed test to clinic.      Here is a list of Health Maintenance topics that are due now or due soon:  Health Maintenance Due   Topic Date Due     PNEUMOCOCCAL (2 of 2 - PPSV23) 06/16/2016     ADVANCE DIRECTIVE PLANNING Q5 YRS  07/25/2016     FIT Q1 YR  03/15/2017     INFLUENZA VACCINE (SYSTEM ASSIGNED)  09/01/2017     We will be calling you in the next couple of weeks to help you schedule any appointments that are needed.  Please call us at [phone] (or use [employer]) to address the above recommendations.     Thank you for trusting [facility] and we appreciate the opportunity to serve you.  We look forward to supporting your healthcare needs in the future.    Healthy Regards,    Dr. Fierro/evelyn

## 2017-11-13 NOTE — LETTER
November 27, 2017    Holly Reina  [address]   [place] [address]      Dear Holly Reina,     We have tried to contact you about your health, but have been unable to reach you.  Please call us as soon as possible so we can provide you with the best care possible.  We will continue to check in with you throughout the year to complete these items of care, if you are not able to complete these items at this time.  If you would like to complete the missing items for your care, please contact us at [phone].    We recommend the following:  -complete a FIT TEST a FIT test or Fecal Immunochemical Occult Blood Test is a take home stool sample kit.  It does not replace the colonoscopy for colorectal cancer screening, but it can detect hidden bleeding in the lower colon.  It does need to be repeated every year and if a positive result is obtained, you would be referred for a colonoscopy.  If you have completed either one of these tests at another facility, please have the records sent to our clinic so that we can best coordinate your care.    Sincerely,     Your Care Team at [facility]

## 2017-12-08 PROCEDURE — 82274 ASSAY TEST FOR BLOOD FECAL: CPT | Performed by: INTERNAL MEDICINE

## 2017-12-13 DIAGNOSIS — Z12.11 SCREEN FOR COLON CANCER: ICD-10-CM

## 2017-12-13 LAB — HEMOCCULT STL QL IA: NEGATIVE

## 2017-12-13 NOTE — LETTER
[facility]  [address] [place]  [address]  [phone]        December 14, 2017    Holly Reina  [address]   [place] [address]        Dear Holly,    Your stool test is negative for occult blood    Results for orders placed or performed in visit on 12/13/17   Fecal colorectal cancer screen (FIT)   Result Value Ref Range    Occult Blood Scn FIT Negative NEG^Negative       If you have any questions please call the clinic at [phone].    Sincerely,    Dora MCALLISTER

## 2018-04-03 ENCOUNTER — TELEPHONE (OUTPATIENT)
Dept: FAMILY MEDICINE | Facility: CLINIC | Age: 68
End: 2018-04-03

## 2018-04-03 DIAGNOSIS — F41.9 ANXIETY: ICD-10-CM

## 2018-04-03 NOTE — TELEPHONE ENCOUNTER
Requested Prescriptions   Pending Prescriptions Disp Refills     ALPRAZolam (XANAX) 0.25 MG tablet [Pharmacy Med Name: ALPRAZOLAM 0.25MG TABLETS] 90 tablet 0     Sig: TAKE 1 TABLET BY MOUTH THREE TIMES DAILY AS NEEDED FOR ANXIETY    There is no refill protocol information for this order         Last Written Prescription Date:  11/9/17  Last Fill Quantity: 90,   # refills: 0  Last Office Visit: 7/18/17  Future Office visit:       Routing refill request to provider for review/approval because:  Drug not on the G, P or [facility] refill protocol or controlled substance

## 2018-04-04 RX ORDER — ALPRAZOLAM 0.25 MG
TABLET ORAL
Qty: 90 TABLET | Refills: 1 | Status: SHIPPED | OUTPATIENT
Start: 2018-04-04 | End: 2019-12-03

## 2018-06-04 ENCOUNTER — TELEPHONE (OUTPATIENT)
Dept: FAMILY MEDICINE | Facility: CLINIC | Age: 68
End: 2018-06-04

## 2018-06-05 NOTE — TELEPHONE ENCOUNTER
PA NOT NEEDED PER INSURNACE    Medication: ALPRAZolam (XANAX) 0.25 MG tablet-PA NOT NEEDED  Insurance Company: [employer] - Phone [phone] Fax [phone]  Pharmacy Filling the Rx: [facility] [address] [place] [address] AVE [address] AT [facility] [place] [address]  Filling Pharmacy Phone: [phone]  Filling Pharmacy Fax:    Start Date: 6/5/2018    Started CMM Key and got a response of Drug is covered by current benefit plan. No further PA activity needed.  Called and spoke with Orly at insurance and she was able to run a claim in to July and it pays out.

## 2018-06-08 ENCOUNTER — TELEPHONE (OUTPATIENT)
Dept: FAMILY MEDICINE | Facility: CLINIC | Age: 68
End: 2018-06-08

## 2018-06-08 NOTE — TELEPHONE ENCOUNTER
Reason for Call:  Other     Detailed comments: patient would like to speak to a nurse about a re certification please call to discuss     Phone Number Patient can be reached at: Home number on file [phone] (home)    Best Time: any    Can we leave a detailed message on this number? YES    Call taken on 6/8/2018 at 2:08 PM by Marybeth Holloway

## 2018-06-08 NOTE — TELEPHONE ENCOUNTER
Called patient and she states that she is due for re-certification for her low income housing. She states she is needing assistance with information regarding her income. RN told her she is not sure provider would be able to help if patient is needing proof of income. RN asked if there was a medical part of the form that the provider needs to fill out and she stated she was not sure. Patient sounded confused as to what was needed to fill out the form. RN asked if she would be able to bring in the document. Patient stated she will bring it in.  Awaiting forms to be dropped off by patient in regards to certification to qualify for low income housing.    Ashley Briseno RN  [facility]

## 2018-06-14 NOTE — TELEPHONE ENCOUNTER
Message left for patient to return call regarding message below. No forms have been received. Is patient still needing something from PCP?

## 2018-06-15 NOTE — TELEPHONE ENCOUNTER
Received a message on the TC line voicemail from Gerri Carter at [facility]. She states that patient told her it was ok to call. She states patient was confused about billing verification that needed to be completed and patient told her that she's been calling PCP regarding this. Gerri states that nothing is needed from PCP and they have this resolved.

## 2018-06-15 NOTE — TELEPHONE ENCOUNTER
Detailed message left for patient informing her that no forms have been received and to contact the clinic if there is assistance needed regarding message below.

## 2018-07-03 ENCOUNTER — TELEPHONE (OUTPATIENT)
Dept: FAMILY MEDICINE | Facility: CLINIC | Age: 68
End: 2018-07-03

## 2018-07-03 NOTE — TELEPHONE ENCOUNTER
"Holly Reina is a 67 year old female who calls with hypertension and symptoms.    NURSING ASSESSMENT:  Description:  Patient calls saying that throughout the week, she has had very high blood pressures and symptoms of left arm weakness/pain, overall weakness, and sweating. Her blood pressures have been 180s/80s-190s/100. She denies chest pain at this time.   Onset/duration:  1 week   Precip. factors:  n/a  Associated symptoms:  See description  Improves/worsens symptoms:  n/a  Allergies:   Allergies   Allergen Reactions     Citalopram Other (See Comments)     Dry mouth, pressures in chest  And nose congestion. Patient also had light headed and nausea     Penicillins      Pravastatin      Aches.      Triamterene-Hctz [Hydrochlorothiazide W/Triamterene]      NURSING PLAN: Nursing advice to patient go to ER now    RECOMMENDED DISPOSITION:  To ED-she states she lives right across from the ER and will get a ride there  Will comply with recommendation: Yes  If further questions/concerns or if symptoms do not improve, worsen or new symptoms develop, call your PCP or [facility] as soon as possible.      Guideline used:  Telephone Triage Protocols for Nurses, Fifth Edition, Romana Krishna \"hypertension\"    Orville Howard RN        "

## 2018-07-10 DIAGNOSIS — I10 HYPERTENSION GOAL BP (BLOOD PRESSURE) < 140/90: ICD-10-CM

## 2018-07-10 DIAGNOSIS — F41.9 ANXIETY: ICD-10-CM

## 2018-07-10 DIAGNOSIS — R42 INTERMITTENT VERTIGO: ICD-10-CM

## 2018-07-11 RX ORDER — SERTRALINE HYDROCHLORIDE 25 MG/1
TABLET, FILM COATED ORAL
Qty: 90 TABLET | Refills: 0 | Status: SHIPPED | OUTPATIENT
Start: 2018-07-11 | End: 2023-04-07

## 2018-07-11 RX ORDER — CLONIDINE HYDROCHLORIDE 0.1 MG/1
TABLET ORAL
Qty: 270 TABLET | Refills: 0 | Status: SHIPPED | OUTPATIENT
Start: 2018-07-11 | End: 2019-04-09

## 2018-07-11 RX ORDER — MECLIZINE HYDROCHLORIDE 25 MG/1
TABLET ORAL
Qty: 24 TABLET | Refills: 0 | Status: SHIPPED | OUTPATIENT
Start: 2018-07-11 | End: 2019-10-15

## 2018-07-11 NOTE — TELEPHONE ENCOUNTER
Routing refill request to provider for review/approval because:  Labs out of range:  Failed protocol.  Please review.  Thank you.  Cintia Mclean RN

## 2018-08-28 ENCOUNTER — TELEPHONE (OUTPATIENT)
Dept: FAMILY MEDICINE | Facility: CLINIC | Age: 68
End: 2018-08-28

## 2018-08-28 ENCOUNTER — OFFICE VISIT (OUTPATIENT)
Dept: FAMILY MEDICINE | Facility: CLINIC | Age: 68
End: 2018-08-28
Payer: COMMERCIAL

## 2018-08-28 VITALS
WEIGHT: 97 LBS | OXYGEN SATURATION: 98 % | TEMPERATURE: 97.5 F | DIASTOLIC BLOOD PRESSURE: 71 MMHG | HEIGHT: 61 IN | HEART RATE: 82 BPM | BODY MASS INDEX: 18.31 KG/M2 | SYSTOLIC BLOOD PRESSURE: 163 MMHG

## 2018-08-28 DIAGNOSIS — R63.6 UNDERWEIGHT: ICD-10-CM

## 2018-08-28 DIAGNOSIS — Z79.899 CONTROLLED SUBSTANCE AGREEMENT SIGNED: ICD-10-CM

## 2018-08-28 DIAGNOSIS — D50.8 OTHER IRON DEFICIENCY ANEMIA: ICD-10-CM

## 2018-08-28 DIAGNOSIS — I10 HYPERTENSION GOAL BP (BLOOD PRESSURE) < 140/90: ICD-10-CM

## 2018-08-28 DIAGNOSIS — J45.20 MILD INTERMITTENT ASTHMA WITHOUT COMPLICATION: ICD-10-CM

## 2018-08-28 DIAGNOSIS — Z00.00 ROUTINE GENERAL MEDICAL EXAMINATION AT A HEALTH CARE FACILITY: Primary | ICD-10-CM

## 2018-08-28 DIAGNOSIS — Z13.6 CARDIOVASCULAR SCREENING; LDL GOAL LESS THAN 130: ICD-10-CM

## 2018-08-28 DIAGNOSIS — Z23 NEED FOR VACCINATION FOR PNEUMOCOCCUS: ICD-10-CM

## 2018-08-28 LAB
ANION GAP SERPL CALCULATED.3IONS-SCNC: 11 MMOL/L (ref 3–14)
BASOPHILS # BLD AUTO: 0 10E9/L (ref 0–0.2)
BASOPHILS NFR BLD AUTO: 0.2 %
BUN SERPL-MCNC: 14 MG/DL (ref 7–30)
CALCIUM SERPL-MCNC: 9.4 MG/DL (ref 8.5–10.1)
CHLORIDE SERPL-SCNC: 106 MMOL/L (ref 94–109)
CHOLEST SERPL-MCNC: 235 MG/DL
CO2 SERPL-SCNC: 24 MMOL/L (ref 20–32)
CREAT SERPL-MCNC: 0.62 MG/DL (ref 0.52–1.04)
DIFFERENTIAL METHOD BLD: NORMAL
EOSINOPHIL # BLD AUTO: 0.1 10E9/L (ref 0–0.7)
EOSINOPHIL NFR BLD AUTO: 2.3 %
ERYTHROCYTE [DISTWIDTH] IN BLOOD BY AUTOMATED COUNT: 14.6 % (ref 10–15)
FERRITIN SERPL-MCNC: 110 NG/ML (ref 8–252)
GFR SERPL CREATININE-BSD FRML MDRD: >90 ML/MIN/1.7M2
GLUCOSE SERPL-MCNC: 93 MG/DL (ref 70–99)
HCT VFR BLD AUTO: 38.3 % (ref 35–47)
HDLC SERPL-MCNC: 81 MG/DL
HGB BLD-MCNC: 12.5 G/DL (ref 11.7–15.7)
IRON SATN MFR SERPL: 26 % (ref 15–46)
IRON SERPL-MCNC: 77 UG/DL (ref 35–180)
LDLC SERPL CALC-MCNC: 140 MG/DL
LYMPHOCYTES # BLD AUTO: 1.9 10E9/L (ref 0.8–5.3)
LYMPHOCYTES NFR BLD AUTO: 39.3 %
MCH RBC QN AUTO: 29.3 PG (ref 26.5–33)
MCHC RBC AUTO-ENTMCNC: 32.6 G/DL (ref 31.5–36.5)
MCV RBC AUTO: 90 FL (ref 78–100)
MONOCYTES # BLD AUTO: 0.4 10E9/L (ref 0–1.3)
MONOCYTES NFR BLD AUTO: 7.9 %
NEUTROPHILS # BLD AUTO: 2.4 10E9/L (ref 1.6–8.3)
NEUTROPHILS NFR BLD AUTO: 50.3 %
NONHDLC SERPL-MCNC: 154 MG/DL
PLATELET # BLD AUTO: 219 10E9/L (ref 150–450)
POTASSIUM SERPL-SCNC: 3.6 MMOL/L (ref 3.4–5.3)
RBC # BLD AUTO: 4.27 10E12/L (ref 3.8–5.2)
SODIUM SERPL-SCNC: 141 MMOL/L (ref 133–144)
TIBC SERPL-MCNC: 292 UG/DL (ref 240–430)
TRIGL SERPL-MCNC: 68 MG/DL
TSH SERPL DL<=0.005 MIU/L-ACNC: 1.06 MU/L (ref 0.4–4)
WBC # BLD AUTO: 4.8 10E9/L (ref 4–11)

## 2018-08-28 PROCEDURE — 90732 PPSV23 VACC 2 YRS+ SUBQ/IM: CPT | Performed by: INTERNAL MEDICINE

## 2018-08-28 PROCEDURE — 82728 ASSAY OF FERRITIN: CPT | Performed by: INTERNAL MEDICINE

## 2018-08-28 PROCEDURE — 85025 COMPLETE CBC W/AUTO DIFF WBC: CPT | Performed by: INTERNAL MEDICINE

## 2018-08-28 PROCEDURE — 99214 OFFICE O/P EST MOD 30 MIN: CPT | Mod: 25 | Performed by: INTERNAL MEDICINE

## 2018-08-28 PROCEDURE — 36415 COLL VENOUS BLD VENIPUNCTURE: CPT | Performed by: INTERNAL MEDICINE

## 2018-08-28 PROCEDURE — 80048 BASIC METABOLIC PNL TOTAL CA: CPT | Performed by: INTERNAL MEDICINE

## 2018-08-28 PROCEDURE — G0009 ADMIN PNEUMOCOCCAL VACCINE: HCPCS | Performed by: INTERNAL MEDICINE

## 2018-08-28 PROCEDURE — 84443 ASSAY THYROID STIM HORMONE: CPT | Performed by: INTERNAL MEDICINE

## 2018-08-28 PROCEDURE — 83550 IRON BINDING TEST: CPT | Performed by: INTERNAL MEDICINE

## 2018-08-28 PROCEDURE — 83540 ASSAY OF IRON: CPT | Performed by: INTERNAL MEDICINE

## 2018-08-28 PROCEDURE — 80061 LIPID PANEL: CPT | Performed by: INTERNAL MEDICINE

## 2018-08-28 RX ORDER — AMLODIPINE BESYLATE 5 MG/1
5 TABLET ORAL DAILY
Qty: 90 TABLET | Refills: 3 | Status: SHIPPED | OUTPATIENT
Start: 2018-08-28 | End: 2019-11-06

## 2018-08-28 NOTE — NURSING NOTE
Screening Questionnaire for Adult Immunization    Are you sick today?   No   Do you have allergies to medications, food, a vaccine component or latex?   No   Have you ever had a serious reaction after receiving a vaccination?   No   Do you have a long-term health problem with heart disease, lung disease, asthma, kidney disease, metabolic disease (e.g. diabetes), anemia, or other blood disorder?   Yes   Do you have cancer, leukemia, HIV/AIDS, or any other immune system problem?   No   In the past 3 months, have you taken medications that affect  your immune system, such as prednisone, other steroids, or anticancer drugs; drugs for the treatment of rheumatoid arthritis, Crohn s disease, or psoriasis; or have you had radiation treatments?   No   Have you had a seizure, or a brain or other nervous system problem?   No   During the past year, have you received a transfusion of blood or blood     products, or been given immune (gamma) globulin or antiviral drug?   No   For women: Are you pregnant or is there a chance you could become        pregnant during the next month?   No   Have you received any vaccinations in the past 4 weeks?   No     Immunization questionnaire was positive for at least one answer.  Notified Dr. Firero.         Patient instructed to remain in clinic for 15 minutes afterwards, and to report any adverse reaction to me immediately.  Vaccine information supplied.       Screening performed by Candice Gomes on 8/28/2018 at 10:35 AM.

## 2018-08-28 NOTE — TELEPHONE ENCOUNTER
FYI PCP:  [place]  reviewed.    Just 2 scripts noted on [place] :      Clonazepam 4/5/18 Qty 90  Clonazepam 11/9/17 Qty 90.    Thank you.  Cintia Mclean RN

## 2018-08-28 NOTE — LETTER
[facility]    08/28/18    Patient: Holly Reina  YOB: 1950  Medical Record Number: [id number]                                                                  Controlled Substance Agreement  I understand that my care provider has prescribed controlled substances (narcotics, tranquilizers, and/or stimulants) to help manage my condition(s).  I am taking this medicine to help me function or work.  I know that this is strong medicine.  It could have serious side effects and even cause a dependency on the drug.  If I stop these medicines suddenly, I could have severe withdrawal symptoms.    The risks, benefits, and side effects of these medication(s) were explained to me.  I agree that:  1. I will take part in other treatments as advised by my provider.  This may be psychiatry or counseling, physical therapy, behavioral therapy, group treatment, or a referral to a pain clinic.  I will reduce or stop my medicine when my provider tells me to do so.   2. I will take my medicines as prescribed.  I will not change the dose or schedule unless my provider tells me to.  There will be no refills if I  run out early.   I may be contacted at any time without warning and asked to complete a drug test or pill count.   3. I will keep all my appointments at the clinic.  If I miss appointments or fail to follow instructions, my provider may stop my medicine.  4. I will not ask other providers to prescribe controlled substances. And I will not accept controlled substances from other people. If I need another prescribed controlled substance for a new reason, I will notify my provider within one business day.  5. If I enroll in the [place] Medical Marijuana program, I will tell my provider.  I will also sign an agreement to share my medical records with my provider.  6. I will use one pharmacy to fill all of my controlled substance prescriptions.  If my prescription is mailed to my pharmacy, it  may take 5 to 7 days for my medicine to be ready.  7. I understand that my provider, clinic care team, and pharmacy can track controlled substance prescriptions from other providers through a central database (prescription monitoring program).  8. I will bring in my list of medications (or my medicine bottles) each time I come to the clinic.  [id number] REV-  07/2018                                                                                                                                   Page 1 of 2      [facility]    08/28/18    Patient: Holly Reina  YOB: 1950  Medical Record Number: [id number]    9. Refills of controlled substances will be made only during office hours.  It is up to me to make sure that I do not run out of my medicines on weekends or holidays.    10. I am responsible for my prescriptions.  If the medicine/prescription is lost or stolen, it will not be replaced.   I also agree not to share these medicines with anyone.  11. I agree to not use ANY illegal or recreational drugs.  This includes marijuana, cocaine, bath salts or other drugs.  I agree not to use alcohol unless my provider says I may.  I agree to give urine samples whenever asked.  If I fail to give a urine sample, the provider may stop my medicine.     12. I will tell my nurse or provider right away if I become pregnant or have a new medical problem treated outside of [facility].  13. I understand that this medicine can affect my thinking and judgment.  It may be unsafe for me to drive, use machinery and do dangerous tasks.  I will not do any of these things until I know how the medicine affects me.  If my dose changes, I will wait to see how it affects me.  I will contact my provider if I have concerns about medicine side effects.  I understand that if I do not follow any of the conditions above, my prescriptions or treatment may be stopped.    I agree that my provider, clinic care team,  and pharmacy may work with any city, state or federal law enforcement agency that investigates the misuse, sale, or other diversion of my controlled medicine. I will allow my provider to discuss my care with or share a copy of this agreement with any other treating provider, pharmacy or emergency room where I receive care.  I agree to give up (waive) any right of privacy or confidentiality with respect to these authorizations.   I have read this agreement and have asked questions about anything I did not understand.   ___________________________________    ___________________________  Patient Signature                                                           Date and Time  ___________________________________     ____________________________  Witness                                                                            Date and Time  ___________________________________  Dora Fierro MD  [id number] REV-  07/2018                                                                                                                                                   Page 2 of 2

## 2018-08-28 NOTE — PATIENT INSTRUCTIONS
"Start low dose iron, \"Slo - Fe\"    Eat red meats // liver a couple times a week.     Restart amlodipine 5 mg daily    Return to clinic 4-6 weeks (early-mid October)          "

## 2018-08-28 NOTE — MR AVS SNAPSHOT
"              After Visit Summary   8/28/2018    Holly Reina    MRN: [id number]           Patient Information     Date Of Birth          1950        Visit Information        Provider Department      8/28/2018 9:20 AM Dora Fierro MD [facility]        Today's Diagnoses     Routine general medical examination at a health care facility    -  1    Hypertension goal BP (blood pressure) < 140/90        Mild intermittent asthma without complication        CARDIOVASCULAR SCREENING; LDL GOAL LESS THAN 130        Other iron deficiency anemia        Controlled substance agreement signed        Need for vaccination for pneumococcus        Underweight          Care Instructions    Start low dose iron, \"Slo - Fe\"    Eat red meats // liver a couple times a week.     Restart amlodipine 5 mg daily    Return to clinic 4-6 weeks (early-mid October)                  Follow-ups after your visit        Follow-up notes from your care team     Return in about 5 weeks (around 10/2/2018).      Who to contact     If you have questions or need follow up information about today's clinic visit or your schedule please contact [facility] directly at [phone].  Normal or non-critical lab and imaging results will be communicated to you by [employer], letter or phone within 4 business days after the clinic has received the results. If you do not hear from us within 7 days, please contact the clinic through [employer] or phone. If you have a critical or abnormal lab result, we will notify you by phone as soon as possible.  Submit refill requests through [employer] (T[employer]) or call your pharmacy and they will forward the refill request to us. Please allow 3 business days for your refill to be completed.          Additional Information About Your Visit        Scivantagehart Information     [employer] ([employer]) lets you send messages to your doctor, view your test results, renew your prescriptions, schedule appointments and " "more. To sign up, go to [email or web address] . Click on \"Log in\" on the left side of the screen, which will take you to the Welcome page. Then click on \"Sign up Now\" on the right side of the page.     You will be asked to enter the access code listed below, as well as some personal information. Please follow the directions to create your username and password.     Your access code is: [email or web address]  Expires: 2018 10:25 AM     Your access code will  in 90 days. If you need help or a new code, please call your [place] clinic or [phone].        Care EveryWhere ID     This is your Care EveryWhere ID. This could be used by other organizations to access your [place] medical records  [id number]        Your Vitals Were     Pulse Temperature Height Pulse Oximetry BMI (Body Mass Index)       82 97.5  F (36.4  C) (Oral) 5' 0.5\" (1.537 m) 98% 18.63 kg/m2        Blood Pressure from Last 3 Encounters:   18 151/69   17 114/63   16 106/60    Weight from Last 3 Encounters:   18 97 lb (44 kg)   17 96 lb (43.5 kg)   16 96 lb (43.5 kg)              We Performed the Following     ADMIN: Vaccine, Initial (40835)     Asthma Action Plan (AAP)     Basic metabolic panel     CBC with platelets differential     Ferritin     Iron and iron binding capacity     Lipid panel reflex to direct LDL Fasting     Pneumococcal vaccine 23 valent PPSV23  (Pneumovax) [91417]     TSH with free T4 reflex          Today's Medication Changes          These changes are accurate as of 18 10:33 AM.  If you have any questions, ask your nurse or doctor.               Start taking these medicines.        Dose/Directions    amLODIPine 5 MG tablet   Commonly known as:  NORVASC   Used for:  Hypertension goal BP (blood pressure) < 140/90   Started by:  Dora Fierro MD        Dose:  5 mg   Take 1 tablet (5 mg) by mouth daily   Quantity:  90 tablet   Refills:  3       ferrous sulfate 142 (45 Fe) MG Tbcr "   Commonly known as:  SLO-FE   Used for:  Other iron deficiency anemia   Started by:  Dora Fierro MD        Dose:  142 mg   Take 1 tablet (142 mg) by mouth daily   Quantity:  90 tablet   Refills:  1            Where to get your medicines      These medications were sent to [id number] - [place], [place] - [address] AT [facility] [address]  [address], [facility] [address]     Phone:  [phone]     amLODIPine 5 MG tablet    ferrous sulfate 142 (45 Fe) MG [facility]                Primary Care Provider Office Phone # Fax #    Dora Fierro [phone] [address] [facility] [address]        Equal Access to Services     PADMINI RALPH : Hadii ro jones hadasho Sonhiali, waaxda luqadaha, qaybta kaalmada adeegyada, divya barker. So [facility] [phone].    ATENCIÓN: Si habla español, tiene a amezquita disposición servicios gratuitos de asistencia lingüística. [facility] [phone].    We comply with applicable federal civil rights laws and [place] laws. We do not discriminate on the basis of race, color, national origin, age, disability, sex, sexual orientation, or gender identity.            Thank you!     Thank you for choosing [facility]  for your care. Our goal is always to provide you with excellent care. Hearing back from our patients is one way we can continue to improve our services. Please take a few minutes to complete the written survey that you may receive in the mail after your visit with us. Thank you!             Your Updated Medication List - Protect others around you: Learn how to safely use, store and throw away your medicines at www.disposemymeds.org.          This list is accurate as of 8/28/18 10:33 AM.  Always use your most recent med list.                   Brand Name Dispense Instructions for use Diagnosis    albuterol 90 MCG/ACT inhaler     1 Inhaler    Inhale 1-2 puffs into the  lungs every 6 hours as needed for shortness of breath / dyspnea.    Allergies       alendronate 70 MG tablet    FOSAMAX    12 tablet    Take 1 tablet (70 mg) by mouth every 7 days Take with over 8 ounces water and stay upright for at least 30 minutes after dose.  Take at least 60 minutes before breakfast    Osteoporosis without current pathological fracture, unspecified osteoporosis type       ALPRAZolam 0.25 MG tablet    XANAX    90 tablet    TAKE 1 TABLET BY MOUTH THREE TIMES DAILY AS NEEDED FOR ANXIETY    Anxiety       amLODIPine 5 MG tablet    NORVASC    90 tablet    Take 1 tablet (5 mg) by mouth daily    Hypertension goal BP (blood pressure) < 140/90       aspirin 81 MG tablet      Take 81 mg by mouth daily        atenolol 50 MG tablet    TENORMIN    90 tablet    Take 1 tablet (50 mg) by mouth daily    Atypical angina (H)       cloNIDine 0.1 MG tablet    CATAPRES    270 tablet    TAKE 1 TABLET(0.1 MG) BY MOUTH THREE TIMES DAILY    Hypertension goal BP (blood pressure) < 140/90       ferrous sulfate 142 (45 Fe) MG Tbcr    SLO-FE    90 tablet    Take 1 tablet (142 mg) by mouth daily    Other iron deficiency anemia       ibuprofen 200 MG tablet    ADVIL/MOTRIN    60 tablet    Take 1 tablet by mouth every 4 hours as needed for pain.    Scoliosis deformity of spine, Spinal stenosis       loratadine 10 MG capsule     90 capsule    Take 10 mg by mouth daily    Chronic rhinitis       meclizine 25 MG tablet    ANTIVERT    24 tablet    TAKE 1 TABLET(25 MG) BY MOUTH EVERY 6 HOURS AS NEEDED    Intermittent vertigo       mometasone 50 MCG/ACT spray    NASONEX    3 Box    Spray 2 sprays into both nostrils daily.    Allergic rhinitis due to other allergen       ONE-A-DAY WOMENS 50 PLUS PO      Take  by mouth daily.    Routine general medical examination at a health care facility       quinapril 20 MG tablet    ACCUPRIL    45 tablet    TAKE ONE-HALF TABLET BY MOUTH DAILY    Hypertension goal BP (blood pressure) < 140/90        sertraline 25 MG tablet    ZOLOFT    90 tablet    TAKE 1 TABLET(25 MG) BY MOUTH EVERY DAY    Anxiety       STATIN NOT PRESCRIBED (INTENTIONAL)      Please choose reason not prescribed, below    CARDIOVASCULAR SCREENING; LDL GOAL LESS THAN 130       TUMS E-X 750 750 MG Chew   Generic drug:  calcium carbonate      Take 750 mg by mouth daily.        vitamin B complex with vitamin C Tabs tablet      Take 1 tablet by mouth daily

## 2018-08-28 NOTE — LETTER
[facility]  [address] [place]  [address]  [phone]        August 29, 2018    Holly Reina  [address]   [place] [address]        Dear Holly,    Enclosed are your results.  Your labs are normal/stable at this time:  Thyroid, kidney function, blood count... All look great.  Some elevation of cholesterol is noted.      Please call  with any questions.  We can also discuss any questions regarding these labs at your next scheduled visit.    Results for orders placed or performed in visit on 08/28/18   Basic metabolic panel   Result Value Ref Range    Sodium 141 133 - 144 mmol/L    Potassium 3.6 3.4 - 5.3 mmol/L    Chloride 106 94 - 109 mmol/L    Carbon Dioxide 24 20 - 32 mmol/L    Anion Gap 11 3 - 14 mmol/L    Glucose 93 70 - 99 mg/dL    Urea Nitrogen 14 7 - 30 mg/dL    Creatinine 0.62 0.52 - 1.04 mg/dL    GFR Estimate >90 >60 mL/min/1.7m2    GFR Estimate If Black >90 >60 mL/min/1.7m2    Calcium 9.4 8.5 - 10.1 mg/dL   Lipid panel reflex to direct LDL Fasting   Result Value Ref Range    Cholesterol 235 (H) <200 mg/dL    Triglycerides 68 <150 mg/dL    HDL Cholesterol 81 >49 mg/dL    LDL Cholesterol Calculated 140 (H) <100 mg/dL    Non HDL Cholesterol 154 (H) <130 mg/dL   CBC with platelets differential   Result Value Ref Range    WBC 4.8 4.0 - 11.0 10e9/L    RBC Count 4.27 3.8 - 5.2 10e12/L    Hemoglobin 12.5 11.7 - 15.7 g/dL    Hematocrit 38.3 35.0 - 47.0 %    MCV 90 78 - 100 fl    MCH 29.3 26.5 - 33.0 pg    MCHC 32.6 31.5 - 36.5 g/dL    RDW 14.6 10.0 - 15.0 %    Platelet Count 219 150 - 450 10e9/L    Diff Method Automated Method     % Neutrophils 50.3 %    % Lymphocytes 39.3 %    % Monocytes 7.9 %    % Eosinophils 2.3 %    % Basophils 0.2 %    Absolute Neutrophil 2.4 1.6 - 8.3 10e9/L    Absolute Lymphocytes 1.9 0.8 - 5.3 10e9/L    Absolute Monocytes 0.4 0.0 - 1.3 10e9/L    Absolute Eosinophils 0.1 0.0 - 0.7 10e9/L    Absolute Basophils 0.0  0.0 - 0.2 10e9/L   Iron and iron binding capacity   Result Value Ref Range    Iron 77 35 - 180 ug/dL    Iron Binding Cap 292 240 - 430 ug/dL    Iron Saturation Index 26 15 - 46 %   Ferritin   Result Value Ref Range    Ferritin 110 8 - 252 ng/mL   TSH with free T4 reflex   Result Value Ref Range    TSH 1.06 0.40 - 4.00 mU/L       If you have any questions please call the clinic at [phone].    Sincerely,    Dora MCALLISTER

## 2018-08-28 NOTE — PROGRESS NOTES
"  SUBJECTIVE:   Holly Reina is a 67 year old female who presents to clinic today for the following health issues:    68 y/o F with anxiety/depr, osteoporosis (Fosamax - has at home and never started yet),  cleft palate. mild intermittent asthma here for med f/u.           Was at [facility] for HTN 7/3/18.   EKG = incomplete RBBB and nsr (baseline)... CXR negative.  BNP, troponin normal     Given NTG...  CBC showed HGB 10.7     no black or bloody stool, may have hemorrhoid bleeding at time.  No hematuria.   Eating okay, a lot of ice cream  Taking a daily B complex    Living independently, feels safe.       Hypertension Follow-up      Outpatient blood pressures are being checked at home.  Results are High per patient .    Low Salt Diet: no added salt      Amount of exercise or physical activity: 3-4 days a week     Problems taking medications regularly: No    Medication side effects: none    Diet: regular (no restrictions)    Home BP are not available for review.  She has had some elevations of BP up to 190 systolic.  Never low.      For some reason, not on norvasc any more. .... \"the bottle just did not have any refills on it\".           Problem list and histories reviewed & adjusted, as indicated.  Additional history: as documented    Patient Active Problem List   Diagnosis     CARDIOVASCULAR SCREENING; LDL GOAL LESS THAN 130     Atypical angina (H)     Hypertension goal BP (blood pressure) < 140/90     ASCUS on Pap smear     Advanced directives, counseling/discussion     Scoliosis deformity of spine     Spinal stenosis     Anxiety     Osteoporosis     Gastroesophageal reflux disease without esophagitis     Fracture of distal end of radius and ulna, right, closed, with routine healing, subsequent encounter     Mild intermittent asthma without complication     Controlled substance agreement signed     Past Surgical History:   Procedure Laterality Date     C JACOB NOSE/CLEFT LIP/TIP  childhood     DECOMPRESSION LUMBAR " ONE LEVEL  2002    l4/5  -- Dr Mckinley     HC DILATION/CURETTAGE DIAG/THER NON OB       HYSTERECTOMY, CERVIX STATUS UNKNOWN         Social History   Substance Use Topics     Smoking status: Former Smoker     Smokeless tobacco: Never Used     Alcohol use Yes      Comment: seldom     Family History   Problem Relation Age of Onset     HEART DISEASE Mother      Hypertension Mother      HEART DISEASE Father      Hypertension Father          Current Outpatient Prescriptions   Medication Sig Dispense Refill     albuterol 90 MCG/ACT inhaler Inhale 1-2 puffs into the lungs every 6 hours as needed for shortness of breath / dyspnea. 1 Inhaler 4     alendronate (FOSAMAX) 70 MG tablet Take 1 tablet (70 mg) by mouth every 7 days Take with over 8 ounces water and stay upright for at least 30 minutes after dose.  Take at least 60 minutes before breakfast 12 tablet 3     ALPRAZolam (XANAX) 0.25 MG tablet TAKE 1 TABLET BY MOUTH THREE TIMES DAILY AS NEEDED FOR ANXIETY 90 tablet 1     amLODIPine (NORVASC) 5 MG tablet Take 1 tablet (5 mg) by mouth daily 90 tablet 3     aspirin 81 MG tablet Take 81 mg by mouth daily       atenolol (TENORMIN) 50 MG tablet Take 1 tablet (50 mg) by mouth daily 90 tablet 3     calcium carbonate (TUMS E-X 750) 750 MG CHEW Take 750 mg by mouth daily.       cloNIDine (CATAPRES) 0.1 MG tablet TAKE 1 TABLET(0.1 MG) BY MOUTH THREE TIMES DAILY 270 tablet 0     ferrous sulfate (SLO-FE) 142 (45 Fe) MG TBCR Take 1 tablet (142 mg) by mouth daily 90 tablet 1     loratadine 10 MG capsule Take 10 mg by mouth daily 90 capsule 3     meclizine (ANTIVERT) 25 MG tablet TAKE 1 TABLET(25 MG) BY MOUTH EVERY 6 HOURS AS NEEDED 24 tablet 0     mometasone (NASONEX) 50 MCG/ACT nasal spray Spray 2 sprays into both nostrils daily. 3 Box 3     Multiple Vitamins-Minerals (ONE-A-DAY WOMENS 50 PLUS PO) Take  by mouth daily.       quinapril (ACCUPRIL) 20 MG tablet TAKE ONE-HALF TABLET BY MOUTH DAILY 45 tablet 3     sertraline (ZOLOFT) 25  MG tablet TAKE 1 TABLET(25 MG) BY MOUTH EVERY DAY 90 tablet 0     STATIN NOT PRESCRIBED, INTENTIONAL, Please choose reason not prescribed, below       vitamin B complex with vitamin C (STRESS TAB) TABS tablet Take 1 tablet by mouth daily       ibuprofen (ADVIL,MOTRIN) 200 MG tablet Take 1 tablet by mouth every 4 hours as needed for pain. (Patient not taking: Reported on 7/18/2017) 60 tablet 0     [DISCONTINUED] amLODIPine (NORVASC) 10 MG tablet Take 1 tablet (10 mg) by mouth daily 90 tablet 3     Allergies   Allergen Reactions     Citalopram Other (See Comments)     Dry mouth, pressures in chest  And nose congestion. Patient also had light headed and nausea     Penicillins      Pravastatin      Aches.      Triamterene-Hctz [Hydrochlorothiazide W/Triamterene]      Recent Labs   Lab Test  06/20/17   1105  03/08/16   1104   06/16/15   1133   06/20/14   1227   09/20/12   1034   09/22/11   1109   LDL  117*  116*   --   94   --   120   --   128   --   125   HDL  75  79   --   70   --   56   --   69   --   54   TRIG  44  53   --   68   --   52   --   64   --   74   ALT   --    --    --    --    --    --    --   17   --   19   CR  0.62  0.62   < >  0.60   < >  0.60   < >  0.54   < >   --    GFRESTIMATED  >90  Non  GFR Calc    >90  Non  GFR Calc     < >  >90  Non  GFR Calc     < >  >90   < >  >90   < >   --    GFRESTBLACK  >90   GFR Calc    >90   GFR Calc     < >  >90   GFR Calc     < >  >90   < >  >90   < >   --    POTASSIUM  3.7  4.2   < >  3.9   < >  3.9   < >  4.4   < >   --    TSH   --   1.07   --    --    --   1.30   < >   --    < >   --     < > = values in this interval not displayed.      BP Readings from Last 3 Encounters:   08/28/18 151/69   07/18/17 114/63   03/08/16 106/60    Wt Readings from Last 3 Encounters:   08/28/18 97 lb (44 kg)   07/18/17 96 lb (43.5 kg)   03/08/16 96 lb (43.5 kg)                  Labs reviewed  "in EPIC    Reviewed and updated as needed this visit by clinical staff  Tobacco  Allergies  Meds  Med Hx  Surg Hx  Fam Hx  Soc Hx      Reviewed and updated as needed this visit by Provider         ROS:  Constitutional, HEENT, cardiovascular, pulmonary, gi and gu systems are negative, except as otherwise noted.    OBJECTIVE:     /69 (BP Location: Right arm, Patient Position: Sitting, Cuff Size: Adult Small)  Pulse 82  Temp 97.5  F (36.4  C) (Oral)  Ht 5' 0.5\" (1.537 m)  Wt 97 lb (44 kg)  SpO2 98%  BMI 18.63 kg/m2  Body mass index is 18.63 kg/(m^2).  GENERAL: healthy, alert and no distress  Thin, her usual habitus.   EYES: Eyes grossly normal to inspection, PERRL and conjunctivae and sclerae normal  NECK: no adenopathy, no asymmetry, masses, or scars and thyroid normal to palpation  HEENT:  Cleft palate  RESP: lungs clear to auscultation - no rales, rhonchi or wheezes  CV: regular rate and rhythm, normal S1 S2, no S3 or S4, no murmur, click or rub, no peripheral edema and peripheral pulses strong  ABDOMEN: soft, nontender, no hepatosplenomegaly, no masses and bowel sounds normal  MS: no gross musculoskeletal defects noted, no edema  NEURO: Normal strength and tone, mentation intact and speech normal  PSYCH: mentation appears normal, affect anxious at baseline.     Diagnostic Test Results:  See orders.     ASSESSMENT/PLAN:           ICD-10-CM    1. Routine general medical examination at a health care facility Z00.00    2. Hypertension goal BP (blood pressure) < 140/90 I10 Basic metabolic panel     amLODIPine (NORVASC) 5 MG tablet   3. Mild intermittent asthma without complication J45.20    4. CARDIOVASCULAR SCREENING; LDL GOAL LESS THAN 130 Z13.6 Lipid panel reflex to direct LDL Fasting   5. Other iron deficiency anemia D50.8 CBC with platelets differential     Iron and iron binding capacity     Ferritin     ferrous sulfate (SLO-FE) 142 (45 Fe) MG TBCR   6. Controlled substance agreement signed " "Z79.899    7. Need for vaccination for pneumococcus Z23 Pneumococcal vaccine 23 valent PPSV23  (Pneumovax) [60852]     ADMIN: Vaccine, Initial (66238)   8. Underweight R63.6 TSH with free T4 reflex       Declines Shingrix for now.     Patient Instructions   Start low dose iron, \"Slo - Fe\"    Eat red meats // liver a couple times a week.     Restart amlodipine 5 mg daily  For some reason, she d/c'd this when it ran out    Return to clinic 4-6 weeks (early-mid October)  Recheck htn..... Recheck CBC...                 Dora Fierro MD  [facility]    "

## 2018-08-29 ASSESSMENT — ASTHMA QUESTIONNAIRES: ACT_TOTALSCORE: 24

## 2018-08-29 NOTE — PROGRESS NOTES
Holly Reina    Enclosed are your results.  Your labs are normal/stable at this time:  Thyroid, kidney function, blood count... All look great.  Some elevation of cholesterol is noted.      Please call  with any questions.  We can also discuss any questions regarding these labs at your next scheduled visit.    Sincerely,    Dora Fierro M.D.

## 2018-11-13 ENCOUNTER — OFFICE VISIT (OUTPATIENT)
Dept: FAMILY MEDICINE | Facility: CLINIC | Age: 68
End: 2018-11-13
Payer: COMMERCIAL

## 2018-11-13 VITALS
RESPIRATION RATE: 16 BRPM | WEIGHT: 100 LBS | TEMPERATURE: 98.5 F | DIASTOLIC BLOOD PRESSURE: 70 MMHG | BODY MASS INDEX: 19.21 KG/M2 | SYSTOLIC BLOOD PRESSURE: 139 MMHG | HEART RATE: 79 BPM | OXYGEN SATURATION: 98 %

## 2018-11-13 DIAGNOSIS — I10 HYPERTENSION GOAL BP (BLOOD PRESSURE) < 140/90: ICD-10-CM

## 2018-11-13 DIAGNOSIS — Z12.11 SCREEN FOR COLON CANCER: ICD-10-CM

## 2018-11-13 DIAGNOSIS — J45.20 MILD INTERMITTENT ASTHMA WITHOUT COMPLICATION: ICD-10-CM

## 2018-11-13 DIAGNOSIS — Z01.818 PREOP GENERAL PHYSICAL EXAM: Primary | ICD-10-CM

## 2018-11-13 LAB
ANION GAP SERPL CALCULATED.3IONS-SCNC: 6 MMOL/L (ref 3–14)
BUN SERPL-MCNC: 10 MG/DL (ref 7–30)
CALCIUM SERPL-MCNC: 9.4 MG/DL (ref 8.5–10.1)
CHLORIDE SERPL-SCNC: 105 MMOL/L (ref 94–109)
CO2 SERPL-SCNC: 30 MMOL/L (ref 20–32)
CREAT SERPL-MCNC: 0.67 MG/DL (ref 0.52–1.04)
GFR SERPL CREATININE-BSD FRML MDRD: 88 ML/MIN/1.7M2
GLUCOSE SERPL-MCNC: 93 MG/DL (ref 70–99)
POTASSIUM SERPL-SCNC: 3.9 MMOL/L (ref 3.4–5.3)
SODIUM SERPL-SCNC: 141 MMOL/L (ref 133–144)

## 2018-11-13 PROCEDURE — 99214 OFFICE O/P EST MOD 30 MIN: CPT | Performed by: INTERNAL MEDICINE

## 2018-11-13 PROCEDURE — 36415 COLL VENOUS BLD VENIPUNCTURE: CPT | Performed by: INTERNAL MEDICINE

## 2018-11-13 PROCEDURE — 80048 BASIC METABOLIC PNL TOTAL CA: CPT | Performed by: INTERNAL MEDICINE

## 2018-11-13 ASSESSMENT — PAIN SCALES - GENERAL: PAINLEVEL: NO PAIN (0)

## 2018-11-13 NOTE — MR AVS SNAPSHOT
After Visit Summary   11/13/2018    Holly Reina    MRN: [id number]           Patient Information     Date Of Birth          1950        Visit Information        Provider Department      11/13/2018 6:40 AM Dora Fierro MD [facility]        Today's Diagnoses     Preop general physical exam    -  1    Mild intermittent asthma without complication        Hypertension goal BP (blood pressure) < 140/90        Screen for colon cancer          Care Instructions      Before Your Surgery      Call your surgeon if there is any change in your health. This includes signs of a cold or flu (such as a sore throat, runny nose, cough, rash or fever).    Do not smoke, drink alcohol or take over the counter medicine (unless your surgeon or primary care doctor tells you to) for the 24 hours before and after surgery.    If you take prescribed drugs: Follow your doctor s orders about which medicines to take and which to stop until after surgery.    Eating and drinking prior to surgery: follow the instructions from your surgeon    Take a shower or bath the night before surgery. Use the soap your surgeon gave you to gently clean your skin. If you do not have soap from your surgeon, use your regular soap. Do not shave or scrub the surgery site.  Wear clean pajamas and have clean sheets on your bed.     Stop aspirin.     Okay to take your morning medications as scheduled the morning of the surgery, with small sips    OTHER:  Complete your FIT test for colon cancer screening            Follow-ups after your visit        Future tests that were ordered for you today     Open Future Orders        Priority Expected Expires Ordered    Fecal colorectal cancer screen FIT Routine 12/4/2018 2/5/2019 11/13/2018            Who to contact     If you have questions or need follow up information about today's clinic visit or your schedule please contact [facility] directly at  [phone].  Normal or non-critical lab and imaging results will be communicated to you by MyChart, letter or phone within 4 business days after the clinic has received the results. If you do not hear from us within 7 days, please contact the clinic through MyChart or phone. If you have a critical or abnormal lab result, we will notify you by phone as soon as possible.  Submit refill requests through [employer] or call your pharmacy and they will forward the refill request to us. Please allow 3 business days for your refill to be completed.          Additional Information About Your Visit        Care EveryWhere ID     This is your Care EveryWhere ID. This could be used by other organizations to access your [place] medical records  [id number]        Your Vitals Were     Pulse Temperature Respirations Pulse Oximetry Breastfeeding? BMI (Body Mass Index)    79 98.5  F (36.9  C) (Oral) 16 98% No 19.21 kg/m2       Blood Pressure from Last 3 Encounters:   11/13/18 139/70   08/28/18 163/71   07/18/17 114/63    Weight from Last 3 Encounters:   11/13/18 100 lb (45.4 kg)   08/28/18 97 lb (44 kg)   07/18/17 96 lb (43.5 kg)              We Performed the Following     Basic metabolic panel        Primary Care Provider Office Phone # Fax #    Dora Fierro [phone]       [address] [facility] [address]        Equal Access to Services     [facility] Center[facility] : Hadii ro jones hadasho Soann-marie, waaxda luqadaha, qaybta kaalmada brigidayada, divya joseph . So [facility] [phone].    ATENCIÓN: Si habla español, tiene a amezquita disposición servicios gratuitos de asistencia lingüística. Clive shaffer [phone].    We comply with applicable federal civil rights laws and [place] laws. We do not discriminate on the basis of race, color, national origin, age, disability, sex, sexual orientation, or gender identity.            Thank you!     Thank you for choosing [facility]  [facility]  for your care. Our goal is always to provide you with excellent care. Hearing back from our patients is one way we can continue to improve our services. Please take a few minutes to complete the written survey that you may receive in the mail after your visit with us. Thank you!             Your Updated Medication List - Protect others around you: Learn how to safely use, store and throw away your medicines at www.disposemymeds.org.          This list is accurate as of 11/13/18  7:42 AM.  Always use your most recent med list.                   Brand Name Dispense Instructions for use Diagnosis    albuterol 90 MCG/ACT inhaler     1 Inhaler    Inhale 1-2 puffs into the lungs every 6 hours as needed for shortness of breath / dyspnea.    Allergies       alendronate 70 MG tablet    FOSAMAX    12 tablet    Take 1 tablet (70 mg) by mouth every 7 days Take with over 8 ounces water and stay upright for at least 30 minutes after dose.  Take at least 60 minutes before breakfast    Osteoporosis without current pathological fracture, unspecified osteoporosis type       ALPRAZolam 0.25 MG tablet    XANAX    90 tablet    TAKE 1 TABLET BY MOUTH THREE TIMES DAILY AS NEEDED FOR ANXIETY    Anxiety       amLODIPine 5 MG tablet    NORVASC    90 tablet    Take 1 tablet (5 mg) by mouth daily    Hypertension goal BP (blood pressure) < 140/90       atenolol 50 MG tablet    TENORMIN    90 tablet    Take 1 tablet (50 mg) by mouth daily    Atypical angina (H)       cloNIDine 0.1 MG tablet    CATAPRES    270 tablet    TAKE 1 TABLET(0.1 MG) BY MOUTH THREE TIMES DAILY    Hypertension goal BP (blood pressure) < 140/90       ferrous sulfate 142 (45 Fe) MG Tbcr    SLO-FE    90 tablet    Take 1 tablet (142 mg) by mouth daily    Other iron deficiency anemia       loratadine 10 MG capsule     90 capsule    Take 10 mg by mouth daily    Chronic rhinitis       meclizine 25 MG tablet    ANTIVERT    24 tablet    TAKE 1 TABLET(25 MG) BY MOUTH EVERY  6 HOURS AS NEEDED    Intermittent vertigo       mometasone 50 MCG/ACT spray    NASONEX    3 Box    Spray 2 sprays into both nostrils daily.    Allergic rhinitis due to other allergen       ONE-A-DAY WOMENS 50 PLUS PO      Take  by mouth daily.    Routine general medical examination at a health care facility       quinapril 20 MG tablet    ACCUPRIL    45 tablet    TAKE ONE-HALF TABLET BY MOUTH DAILY    Hypertension goal BP (blood pressure) < 140/90       sertraline 25 MG tablet    ZOLOFT    90 tablet    TAKE 1 TABLET(25 MG) BY MOUTH EVERY DAY    Anxiety       STATIN NOT PRESCRIBED (INTENTIONAL)      Please choose reason not prescribed, below    CARDIOVASCULAR SCREENING; LDL GOAL LESS THAN 130       TUMS E-X 750 750 MG Chew   Generic drug:  calcium carbonate      Take 750 mg by mouth daily.        vitamin B complex with vitamin C Tabs tablet      Take 1 tablet by mouth daily

## 2018-11-13 NOTE — PATIENT INSTRUCTIONS
Before Your Surgery      Call your surgeon if there is any change in your health. This includes signs of a cold or flu (such as a sore throat, runny nose, cough, rash or fever).    Do not smoke, drink alcohol or take over the counter medicine (unless your surgeon or primary care doctor tells you to) for the 24 hours before and after surgery.    If you take prescribed drugs: Follow your doctor s orders about which medicines to take and which to stop until after surgery.    Eating and drinking prior to surgery: follow the instructions from your surgeon    Take a shower or bath the night before surgery. Use the soap your surgeon gave you to gently clean your skin. If you do not have soap from your surgeon, use your regular soap. Do not shave or scrub the surgery site.  Wear clean pajamas and have clean sheets on your bed.     Stop aspirin.     Okay to take your morning medications as scheduled the morning of the surgery, with small sips    OTHER:  Complete your FIT test for colon cancer screening

## 2018-11-13 NOTE — LETTER
[facility]  [address] [place]  [address]  [phone]        November 13, 2018    Holly SHELTON Super  [address]   [facility] [address]        Dear Holly,    Enclosed are your results.  Your labs are normal/stable at this time.     Please call  with any questions.  We can also discuss any questions regarding these labs at your next scheduled visit.    Results for orders placed or performed in visit on 11/13/18   Basic metabolic panel   Result Value Ref Range    Sodium 141 133 - 144 mmol/L    Potassium 3.9 3.4 - 5.3 mmol/L    Chloride 105 94 - 109 mmol/L    Carbon Dioxide 30 20 - 32 mmol/L    Anion Gap 6 3 - 14 mmol/L    Glucose 93 70 - 99 mg/dL    Urea Nitrogen 10 7 - 30 mg/dL    Creatinine 0.67 0.52 - 1.04 mg/dL    GFR Estimate 88 >60 mL/min/1.7m2    GFR Estimate If Black >90 >60 mL/min/1.7m2    Calcium 9.4 8.5 - 10.1 mg/dL       If you have any questions please call the clinic at [phone].    Sincerely,    Dora MCALLISTER

## 2018-11-13 NOTE — PROGRESS NOTES
[address] [id number] [phone]  Dept: [phone]    PRE-OP EVALUATION:  Today's date: 2018    Holly Reina (: 1950) presents for pre-operative evaluation assessment as requested by Dr. Yogesh Urena.  She requires evaluation and anesthesia risk assessment prior to undergoing surgery/procedure for treatment of Dental Work .    Proposed Surgery/ Procedure: Dental work , get teeth pulled.  Date of Surgery/ Procedure: 18  Time of Surgery/ Procedure: 9:30AM  [facility]/Surgical Facility: [facility]  Fax number for surgical facility: [phone]  Primary Physician: Dora Fierro  Type of Anesthesia Anticipated: General    Patient has a Health Care Directive or Living Will:  NO    1. NO - Do you have a history of heart attack, stroke, stent, bypass or surgery on an artery in the head, neck, heart or legs?  2. NO - Do you ever have any pain or discomfort in your chest?  3. NO - Do you have a history of  Heart Failure?  4. YES - ARE YOUR TROUBLED BY SHORTNESS OF BREATH WHEN WALKING ON THE LEVEL, UP A SLIGHT HILL OR AT NIGHT?   She is limited by sinus problems rather than pulmonay  5. NO - Do you currently have a cold, bronchitis or other respiratory infection?  6. YES - DO YOU HAVE A COUGH, SHORTNESS OF BREATH OR WHEEZING?  Has dry cough at times due to sinus drainage  7. YES - DO YOU SOMETIMES GET PAINS IN THE CALVES OF YOUR LEGS WHEN YOU WALK? H/o djd  8. NO - Do you or anyone in your family have previous history of blood clots?  9. NO - Do you or does anyone in your family have a serious bleeding problem such as prolonged bleeding following surgeries or cuts?  10. YES - HAVE YOU EVER HAD PROBLEMS WITH ANEMIA OR BEEN TOLD TO TAKE IRON PILLS? Remote hx.  11. NO - Have you had any abnormal blood loss such as black, tarry or bloody stools, or abnormal vaginal bleeding?  12. YES - HAVE YOU EVER HAD A  "BLOOD TRANSFUSION? > 10 years ago.    13. NO - Have you or any of your relatives ever had problems with anesthesia?  14. YES - DO YOU HAVE SLEEP APNEA, EXCESSIVE SNORING OR DAYTIME DROWSINESS?   15. NO - Do you have any prosthetic heart valves?  16. NO - Do you have prosthetic joints?  17. NO - Is there any chance that you may be pregnant?      HPI:     HPI related to upcoming procedure: 66 y/o F with anxiety/depr, osteoporosis ,  cleft palate. mild intermittent asthma here for preop \"getting teeth pulled\".  This will be done under general anesthesia.       ASTHMA - Patient has a longstanding history of moderate-severe Asthma . Patient has been doing well overall noting NO SYMPTOMS and continues on medication regimen consisting of prn albuterol without adverse reactions or side effects.                                                                                                                                               .  HYPERTENSION - Patient has longstanding history of HTN , currently denies any symptoms referable to elevated blood pressure. Specifically denies chest pain, palpitations, dyspnea, orthopnea, PND or peripheral edema. Blood pressure readings have been in normal range. Current medication regimen is as listed below. Patient denies any side effects of medication.                                                                                                                                                                                          .    MEDICAL HISTORY:     Patient Active Problem List    Diagnosis Date Noted     Controlled substance agreement signed 08/28/2018     Priority: Medium     Patient is followed by S[facility] for ongoing prescription of benzodiazepines.  All refills should be approved by this provider, or covering partner.    Medication(s): xanax.   Maximum quantity per month: 60  (gets 180 at a time for a 3 month supply)  Clinic visit frequency required: Q 6  " months     Controlled substance agreement on file: Yes       Date(s): 8/28/18  Benzodiazepine use reviewed by psychiatry:  No    Last [facility] website verification:  done on 8/28/18   [email or web address]           Mild intermittent asthma without complication 03/08/2016     Priority: Medium     Fracture of distal end of radius and ulna, right, closed, with routine healing, subsequent encounter 12/22/2015     Priority: Medium     Gastroesophageal reflux disease without esophagitis 09/15/2015     Priority: Medium     Osteoporosis 06/16/2015     Priority: Medium     Anxiety 03/13/2012     Priority: Medium     Scoliosis deformity of spine 09/07/2011     Priority: Medium     Spinal stenosis 09/07/2011     Priority: Medium     Advanced directives, counseling/discussion 07/25/2011     Priority: Medium     Advance Directive Problem List Overview:   Name Relationship Phone    Primary Health Care Agent            Alternative Health Care Agent          Discussed advance care planning with patient; information given to patient to review. 7/25/2011          ASCUS on Pap smear 07/06/2011     Priority: Medium     7/8/10 pap ASCUS neg HPV.  Per protocol, repeat screening pap in 1 year (7/2011)  4/11/12 pap NIL. Plan-- routine screening       Atypical angina (H)      Priority: Medium     Hypertension goal BP (blood pressure) < 140/90      Priority: Medium     CARDIOVASCULAR SCREENING; LDL GOAL LESS THAN 130 10/31/2010     Priority: Medium      Past Medical History:   Diagnosis Date     Anxiety      Atypical angina (H)      Cleft palate     infections     GERD (gastroesophageal reflux disease)      Hypertension goal BP (blood pressure) < 140/90      RBBB (right bundle branch block)      SBO (small bowel obstruction) (H) 9-03     Spinal stenosis      Stress incontinence      Weight loss     unclear etiology.  workup negative     Past Surgical History:   Procedure Laterality Date     C JACOB NOSE/CLEFT LIP/TIP  childhood      DECOMPRESSION LUMBAR ONE LEVEL  2002    l4/5  -- Dr Mckinley     HC DILATION/CURETTAGE DIAG/THER NON OB       HYSTERECTOMY, CERVIX STATUS UNKNOWN       Current Outpatient Prescriptions   Medication Sig Dispense Refill     albuterol 90 MCG/ACT inhaler Inhale 1-2 puffs into the lungs every 6 hours as needed for shortness of breath / dyspnea. 1 Inhaler 4     ALPRAZolam (XANAX) 0.25 MG tablet TAKE 1 TABLET BY MOUTH THREE TIMES DAILY AS NEEDED FOR ANXIETY 90 tablet 1     amLODIPine (NORVASC) 5 MG tablet Take 1 tablet (5 mg) by mouth daily 90 tablet 3     aspirin 81 MG tablet Take 81 mg by mouth daily       atenolol (TENORMIN) 50 MG tablet Take 1 tablet (50 mg) by mouth daily 90 tablet 3     calcium carbonate (TUMS E-X 750) 750 MG CHEW Take 750 mg by mouth daily.       cloNIDine (CATAPRES) 0.1 MG tablet TAKE 1 TABLET(0.1 MG) BY MOUTH THREE TIMES DAILY 270 tablet 0     ferrous sulfate (SLO-FE) 142 (45 Fe) MG TBCR Take 1 tablet (142 mg) by mouth daily 90 tablet 1     ibuprofen (ADVIL,MOTRIN) 200 MG tablet Take 1 tablet by mouth every 4 hours as needed for pain. 60 tablet 0     loratadine 10 MG capsule Take 10 mg by mouth daily 90 capsule 3     mometasone (NASONEX) 50 MCG/ACT nasal spray Spray 2 sprays into both nostrils daily. 3 Box 3     Multiple Vitamins-Minerals (ONE-A-DAY WOMENS 50 PLUS PO) Take  by mouth daily.       quinapril (ACCUPRIL) 20 MG tablet TAKE ONE-HALF TABLET BY MOUTH DAILY 45 tablet 3     sertraline (ZOLOFT) 25 MG tablet TAKE 1 TABLET(25 MG) BY MOUTH EVERY DAY 90 tablet 0     STATIN NOT PRESCRIBED, INTENTIONAL, Please choose reason not prescribed, below       vitamin B complex with vitamin C (STRESS TAB) TABS tablet Take 1 tablet by mouth daily       alendronate (FOSAMAX) 70 MG tablet Take 1 tablet (70 mg) by mouth every 7 days Take with over 8 ounces water and stay upright for at least 30 minutes after dose.  Take at least 60 minutes before breakfast (Patient not taking: Reported on 11/13/2018) 12 tablet  "3     meclizine (ANTIVERT) 25 MG tablet TAKE 1 TABLET(25 MG) BY MOUTH EVERY 6 HOURS AS NEEDED (Patient not taking: Reported on 11/13/2018) 24 tablet 0     OTC products: None, except as noted above    Allergies   Allergen Reactions     Citalopram Other (See Comments)     Dry mouth, pressures in chest  And nose congestion. Patient also had light headed and nausea     No Clinical Screening - See Comments      \"I'm allergic to Laughing Gas at the Dentist\"     Penicillins      Pravastatin      Aches.      Triamterene-Hctz [Hydrochlorothiazide W/Triamterene]       Latex Allergy: NO    Social History   Substance Use Topics     Smoking status: Former Smoker     Smokeless tobacco: Never Used     Alcohol use Yes      Comment: seldom     History   Drug Use No       REVIEW OF SYSTEMS:   CONSTITUTIONAL: NEGATIVE for fever, chills, change in weight  ENT/MOUTH:has poor breathing due to sinus issues.   RESP: NEGATIVE for significant cough or SOB  CV: NEGATIVE for chest pain, palpitations or peripheral edema  GI: NEGATIVE for nausea, abdominal pain, heartburn, or change in bowel habits and some occasional hemorrhoid bleeding.    MUSCULOSKELETAL: chronic dejenerative joint arthritis , h/o spinal stensosis.  Psych:  H/o significant anxiety.    Using sertraline and prn xanax (usually 1-2 per day).  She has been managing this well the past several years.      EXAM:   /70 (BP Location: Right arm, Patient Position: Chair, Cuff Size: Adult Small)  Pulse 79  Temp 98.5  F (36.9  C) (Oral)  Resp 16  Wt 100 lb (45.4 kg)  SpO2 98%  Breastfeeding? No  BMI 19.21 kg/m2    GENERAL APPEARANCE: healthy, alert and no distress     EYES: EOMI, PERRL     HENT: ear canals and TM's normal and nose and mouth without ulcers or lesions     HENT: micrognathia, cleft palate, crowded teeth with some broken     NECK: no adenopathy, no asymmetry, masses, or scars and thyroid normal to palpation     RESP: lungs clear to auscultation - no rales, rhonchi " "or wheezes     CV: regular rates and rhythm, normal S1 S2, no S3 or S4 and no murmur, click or rub     ABDOMEN:  soft, nontender, no HSM or masses and bowel sounds normal     MS: extremities normal- no gross deformities noted, no evidence of inflammation in joints, FROM in all extremities.     MS: antalgic gait.  scoliosis     SKIN: no suspicious lesions or rashes     NEURO: Normal strength and tone, sensory exam grossly normal, mentation intact and speech normal     NEURO  No tremor     PSYCH: mentation appears normal. and affect normal/bright     PSYCH:  Friendly.  Pressured speech (baseline)     LYMPHATICS: No cervical adenopathy    DIAGNOSTICS:   EKG: appears normal, NSR, normal axis, normal intervals, no acute ST/T changes c/w ischemia, no LVH by voltage criteria, Right Bundle Branch Block, unchanged from previous tracings, this was done 8/2018 at [facility].     Recent Labs   Lab Test  08/28/18   1044  06/20/17   1105  03/08/16   1104   HGB  12.5   --   12.1   PLT  219   --   189   NA  141  143  140   POTASSIUM  3.6  3.7  4.2   CR  0.62  0.62  0.62        IMPRESSION:   Reason for surgery/procedure: tooth decay and crowded oropharynx  Diagnosis/reason for consult: med clearance    The proposed surgical procedure is considered LOW risk.    REVISED CARDIAC RISK INDEX  The patient has the following serious cardiovascular risks for perioperative complications such as (MI, PE, VFib and 3  AV Block):  No serious cardiac risks  INTERPRETATION: 0 risks: Class I (very low risk - 0.4% complication rate)    The patient has the following additional risks for perioperative complications:  Anxiety: responds well to reassurance and redirection.   \"I'M ALLERGIC TO LAUGHING GAS SO DON'T GIVE ME THAT\"  Dejenerative joint arthritis, watch positioning for comfort.       ICD-10-CM    1. Preop general physical exam Z01.818    2. Mild intermittent asthma without complication J45.20    3. Hypertension goal BP (blood pressure) < " 140/90 I10 Basic metabolic panel   4. Screen for colon cancer Z12.11 Fecal colorectal cancer screen FIT       RECOMMENDATIONS:          --Patient is to take all scheduled medications on the day of surgery EXCEPT for modifications listed below.    -- NOTE:  She has never started the Fosamax.     APPROVAL GIVEN to proceed with proposed procedure, without further diagnostic evaluation/  Labs are pending.        Signed Electronically by: Dora Fierro MD    Copy of this evaluation report is provided to requesting physician.    [place] Preop Guidelines    Revised Cardiac Risk Index

## 2018-11-14 NOTE — NURSING NOTE
Pre op history and physical and labs faxed to [facility]. Message placed on cover sheet that they can contact [facility] for a copy of 07/2018 EKG if needed.

## 2018-12-09 PROCEDURE — 82274 ASSAY TEST FOR BLOOD FECAL: CPT | Performed by: INTERNAL MEDICINE

## 2018-12-11 DIAGNOSIS — I10 HYPERTENSION GOAL BP (BLOOD PRESSURE) < 140/90: ICD-10-CM

## 2018-12-11 DIAGNOSIS — I20.89 ATYPICAL ANGINA (H): ICD-10-CM

## 2018-12-11 NOTE — TELEPHONE ENCOUNTER
"Requested Prescriptions   Pending Prescriptions Disp Refills     quinapril (ACCUPRIL) 20 MG tablet [Pharmacy Med Name: QUINAPRIL 20MG TABLETS] 45 tablet 0    Last Written Prescription Date:  7/18/17  Last Fill Quantity: 45,  # refills: 3   Last office visit: 11/13/2018 with prescribing provider:     Future Office Visit:     Sig: TAKE 1/2 TABLET BY MOUTH DAILY    ACE Inhibitors (Including Combos) Protocol Passed - 12/11/2018  1:01 PM       Passed - Blood pressure under 140/90 in past 12 months    BP Readings from Last 3 Encounters:   11/13/18 139/70   08/28/18 163/71   07/18/17 114/63                Passed - Recent (12 mo) or future (30 days) visit within the authorizing provider's specialty    Patient had office visit in the last 12 months or has a visit in the next 30 days with authorizing provider or within the authorizing provider's specialty.  See \"Patient Info\" tab in inbasket, or \"Choose Columns\" in Meds & Orders section of the refill encounter.             Passed - Patient is age 18 or older       Passed - No active pregnancy on record       Passed - Normal serum creatinine on file in past 12 months    Recent Labs   Lab Test 11/13/18  0746   CR 0.67            Passed - Normal serum potassium on file in past 12 months    Recent Labs   Lab Test 11/13/18  0746   POTASSIUM 3.9            Passed - No positive pregnancy test in past 12 months        atenolol (TENORMIN) 50 MG tablet [Pharmacy Med Name: ATENOLOL 50MG TABLETS] 90 tablet 0    Last Written Prescription Date:  11/10/17  Last Fill Quantity: 90,  # refills: 3   Last office visit: 11/13/2018 with prescribing provider:     Future Office Visit:     Sig: TAKE 1 TABLET(50 MG) BY MOUTH DAILY    Beta-Blockers Protocol Passed - 12/11/2018  1:01 PM       Passed - Blood pressure under 140/90 in past 12 months    BP Readings from Last 3 Encounters:   11/13/18 139/70   08/28/18 163/71   07/18/17 114/63                Passed - Patient is age 6 or older       Passed - " "Recent (12 mo) or future (30 days) visit within the authorizing provider's specialty    Patient had office visit in the last 12 months or has a visit in the next 30 days with authorizing provider or within the authorizing provider's specialty.  See \"Patient Info\" tab in inbasket, or \"Choose Columns\" in Meds & Orders section of the refill encounter.                "

## 2018-12-12 DIAGNOSIS — Z12.11 SCREEN FOR COLON CANCER: ICD-10-CM

## 2018-12-12 RX ORDER — ATENOLOL 50 MG/1
TABLET ORAL
Qty: 90 TABLET | Refills: 0 | Status: SHIPPED | OUTPATIENT
Start: 2018-12-12 | End: 2019-04-09

## 2018-12-12 RX ORDER — QUINAPRIL 20 MG/1
TABLET ORAL
Qty: 45 TABLET | Refills: 0 | Status: SHIPPED | OUTPATIENT
Start: 2018-12-12 | End: 2019-04-09

## 2018-12-13 LAB — HEMOCCULT STL QL IA: NEGATIVE

## 2018-12-14 ENCOUNTER — TELEPHONE (OUTPATIENT)
Dept: FAMILY MEDICINE | Facility: CLINIC | Age: 68
End: 2018-12-14

## 2019-03-14 ENCOUNTER — TELEPHONE (OUTPATIENT)
Dept: FAMILY MEDICINE | Facility: CLINIC | Age: 69
End: 2019-03-14

## 2019-03-14 NOTE — TELEPHONE ENCOUNTER
Forms received from: [employer]   Phone number listed: [phone]   Fax listed: [phone]  Date received: 03/14/2019  Form description: Verification-Need for a  Animal  Once forms are completed, [facility]  Is patient requesting to be contacted when forms are completed: NA    Form placed: In provider's basket  Silvia Linares

## 2019-03-15 NOTE — TELEPHONE ENCOUNTER
Message left for patient to return call to TC line. Need to clarify request on form. Form is at Team 1 TC Desk.

## 2019-03-20 NOTE — TELEPHONE ENCOUNTER
Patient returned call and TC got clarification on what was needed. Forms placed in provider's basket for completion.

## 2019-03-20 NOTE — TELEPHONE ENCOUNTER
Message left for patient to return call to TC line. Message left with Gerri at [facility] notifying her that we are waiting for patient to return call for clarification prior to forms completion. Forms have been placed in Team 1 TC  the Pending Forms folder.

## 2019-04-09 DIAGNOSIS — I10 HYPERTENSION GOAL BP (BLOOD PRESSURE) < 140/90: ICD-10-CM

## 2019-04-09 NOTE — TELEPHONE ENCOUNTER
"Requested Prescriptions   Pending Prescriptions Disp Refills     cloNIDine (CATAPRES) 0.1 MG tablet [Pharmacy Med Name: CLONIDINE 0.1MG TABLETS] 270 tablet 0     Sig: TAKE 1 TABLET BY MOUTH THREE TIMES DAILY   Last Written Prescription Date:  7-11-18  Last Fill Quantity: 270,  # refills: 0   Last office visit: 11/13/2018 with prescribing provider:     Future Office Visit:        Central Acting Antiadrenergic Agents Passed - 4/9/2019 12:51 PM        Passed - Blood pressure under 140/90 in past 12 months     BP Readings from Last 3 Encounters:   11/13/18 139/70   08/28/18 163/71   07/18/17 114/63                 Passed - Patient is 6 years of age or older        Passed - Recent (12 mo) or future (30 days) visit within the authorizing provider's specialty     Patient had office visit in the last 12 months or has a visit in the next 30 days with authorizing provider or within the authorizing provider's specialty.  See \"Patient Info\" tab in inbasket, or \"Choose Columns\" in Meds & Orders section of the refill encounter.              Passed - Medication is active on med list        Passed - Patient not pregnant        Passed - Normal serum creatinine on file within past 12 months     Recent Labs   Lab Test 11/13/18  0746   CR 0.67             Passed - No positive pregnancy test on file in past 12 months          "

## 2019-04-10 RX ORDER — CLONIDINE HYDROCHLORIDE 0.1 MG/1
TABLET ORAL
Qty: 270 TABLET | Refills: 0 | Status: SHIPPED | OUTPATIENT
Start: 2019-04-10 | End: 2019-07-23

## 2019-04-10 NOTE — TELEPHONE ENCOUNTER
Prescription approved per [facility] Refill Protocol.  Jessica Monk RN

## 2019-07-23 DIAGNOSIS — I10 HYPERTENSION GOAL BP (BLOOD PRESSURE) < 140/90: ICD-10-CM

## 2019-07-23 NOTE — TELEPHONE ENCOUNTER
"Requested Prescriptions   Pending Prescriptions Disp Refills     cloNIDine (CATAPRES) 0.1 MG tablet [Pharmacy Med Name: CLONIDINE 0.1MG TABLETS]  Last Written Prescription Date:  4/10/19  Last Fill Quantity: 270,  # refills: 0   Last office visit: 11/13/2018 with prescribing provider:  Sri   Future Office Visit:     270 tablet 0     Sig: TAKE 1 TABLET BY MOUTH THREE TIMES DAILY       Central Acting Antiadrenergic Agents Passed - 7/23/2019  1:18 PM        Passed - Blood pressure under 140/90 in past 12 months     BP Readings from Last 3 Encounters:   11/13/18 139/70   08/28/18 163/71   07/18/17 114/63                 Passed - Patient is 6 years of age or older        Passed - Recent (12 mo) or future (30 days) visit within the authorizing provider's specialty     Patient had office visit in the last 12 months or has a visit in the next 30 days with authorizing provider or within the authorizing provider's specialty.  See \"Patient Info\" tab in inbasket, or \"Choose Columns\" in Meds & Orders section of the refill encounter.              Passed - Medication is active on med list        Passed - Patient not pregnant        Passed - Normal serum creatinine on file within past 12 months     Recent Labs   Lab Test 11/13/18  0746   CR 0.67             Passed - No positive pregnancy test on file in past 12 months          "

## 2019-07-26 RX ORDER — CLONIDINE HYDROCHLORIDE 0.1 MG/1
TABLET ORAL
Qty: 270 TABLET | Refills: 0 | Status: SHIPPED | OUTPATIENT
Start: 2019-07-26 | End: 2019-11-06

## 2019-07-26 NOTE — TELEPHONE ENCOUNTER
Prescription approved per [facility] Refill Protocol.  With note to pharmacy needs OV      Jenna Ang RN  [facility]

## 2019-10-15 ENCOUNTER — OFFICE VISIT (OUTPATIENT)
Dept: FAMILY MEDICINE | Facility: CLINIC | Age: 69
End: 2019-10-15
Payer: COMMERCIAL

## 2019-10-15 VITALS
SYSTOLIC BLOOD PRESSURE: 126 MMHG | DIASTOLIC BLOOD PRESSURE: 65 MMHG | BODY MASS INDEX: 19.53 KG/M2 | TEMPERATURE: 98.1 F | HEIGHT: 60 IN | OXYGEN SATURATION: 98 % | HEART RATE: 73 BPM | WEIGHT: 99.5 LBS

## 2019-10-15 DIAGNOSIS — J45.20 MILD INTERMITTENT ASTHMA WITHOUT COMPLICATION: ICD-10-CM

## 2019-10-15 DIAGNOSIS — Z12.11 SPECIAL SCREENING FOR MALIGNANT NEOPLASMS, COLON: ICD-10-CM

## 2019-10-15 DIAGNOSIS — Z13.6 CARDIOVASCULAR SCREENING; LDL GOAL LESS THAN 130: ICD-10-CM

## 2019-10-15 DIAGNOSIS — Z01.818 PREOP GENERAL PHYSICAL EXAM: Primary | ICD-10-CM

## 2019-10-15 DIAGNOSIS — D64.9 ANEMIA, NORMOCYTIC NORMOCHROMIC: ICD-10-CM

## 2019-10-15 DIAGNOSIS — I10 HYPERTENSION GOAL BP (BLOOD PRESSURE) < 140/90: ICD-10-CM

## 2019-10-15 DIAGNOSIS — Z12.31 ENCOUNTER FOR SCREENING MAMMOGRAM FOR BREAST CANCER: ICD-10-CM

## 2019-10-15 DIAGNOSIS — I95.0 IDIOPATHIC HYPOTENSION: ICD-10-CM

## 2019-10-15 DIAGNOSIS — Q35.9 CLEFT PALATE: ICD-10-CM

## 2019-10-15 LAB
ANION GAP SERPL CALCULATED.3IONS-SCNC: 6 MMOL/L (ref 3–14)
BUN SERPL-MCNC: 10 MG/DL (ref 7–30)
CALCIUM SERPL-MCNC: 9.1 MG/DL (ref 8.5–10.1)
CHLORIDE SERPL-SCNC: 106 MMOL/L (ref 94–109)
CHOLEST SERPL-MCNC: 215 MG/DL
CO2 SERPL-SCNC: 28 MMOL/L (ref 20–32)
CREAT SERPL-MCNC: 0.63 MG/DL (ref 0.52–1.04)
ERYTHROCYTE [DISTWIDTH] IN BLOOD BY AUTOMATED COUNT: 13.7 % (ref 10–15)
GFR SERPL CREATININE-BSD FRML MDRD: >90 ML/MIN/{1.73_M2}
GLUCOSE SERPL-MCNC: 103 MG/DL (ref 70–99)
HCT VFR BLD AUTO: 35.7 % (ref 35–47)
HDLC SERPL-MCNC: 70 MG/DL
HGB BLD-MCNC: 11.5 G/DL (ref 11.7–15.7)
LDLC SERPL CALC-MCNC: 134 MG/DL
MCH RBC QN AUTO: 28.9 PG (ref 26.5–33)
MCHC RBC AUTO-ENTMCNC: 32.2 G/DL (ref 31.5–36.5)
MCV RBC AUTO: 90 FL (ref 78–100)
NONHDLC SERPL-MCNC: 145 MG/DL
PLATELET # BLD AUTO: 206 10E9/L (ref 150–450)
POTASSIUM SERPL-SCNC: 4 MMOL/L (ref 3.4–5.3)
RBC # BLD AUTO: 3.98 10E12/L (ref 3.8–5.2)
SODIUM SERPL-SCNC: 140 MMOL/L (ref 133–144)
TRIGL SERPL-MCNC: 57 MG/DL
WBC # BLD AUTO: 7.2 10E9/L (ref 4–11)

## 2019-10-15 PROCEDURE — 99215 OFFICE O/P EST HI 40 MIN: CPT | Performed by: INTERNAL MEDICINE

## 2019-10-15 PROCEDURE — 80048 BASIC METABOLIC PNL TOTAL CA: CPT | Performed by: INTERNAL MEDICINE

## 2019-10-15 PROCEDURE — 93000 ELECTROCARDIOGRAM COMPLETE: CPT | Performed by: INTERNAL MEDICINE

## 2019-10-15 PROCEDURE — 85027 COMPLETE CBC AUTOMATED: CPT | Performed by: INTERNAL MEDICINE

## 2019-10-15 PROCEDURE — 80061 LIPID PANEL: CPT | Performed by: INTERNAL MEDICINE

## 2019-10-15 PROCEDURE — 36415 COLL VENOUS BLD VENIPUNCTURE: CPT | Performed by: INTERNAL MEDICINE

## 2019-10-15 ASSESSMENT — MIFFLIN-ST. JEOR: SCORE: 892.83

## 2019-10-15 NOTE — PROGRESS NOTES
[address] [id number] [phone]  Dept: [phone]    PRE-OP EVALUATION:  Today's date: 10/15/2019    Holly Reina (: 1950) presents for pre-operative evaluation assessment as requested by Dr. Urena.  She requires evaluation and anesthesia risk assessment prior to undergoing surgery/procedure for treatment of Dental implants  .    Proposed Surgery/ Procedure: Dental implants  Date of Surgery/ Procedure: 10/24/19  Time of Surgery/ Procedure:   Hospital/Surgical Facility: [facility]   Dr Yogesh Urena  Fax number for surgical facility: [phone]  Primary Physician: Dora Fierro  Type of Anesthesia Anticipated: to be determined    Patient has a Health Care Directive or Living Will:  NO    1. NO - Do you have a history of heart attack, stroke, stent, bypass or surgery on an artery in the head, neck, heart or legs?  2. NO - Do you ever have any pain or discomfort in your chest?  3. NO - Do you have a history of  Heart Failure?  4. NO - Are you troubled by shortness of breath when: walking on the level, up a slight hill or at night?  5. NO - Do you currently have a cold, bronchitis or other respiratory infection?  6. NO - Do you have a cough, shortness of breath or wheezing?  7. YES - DO YOU SOMETIMES GET PAINS IN THE CALVES OF YOUR LEGS WHEN YOU WALK?  Mild cough lately, no wheezing   7. YES - Do you sometimes get pains in the calves of your legs when you walk?  - H/o Spinal stenosis  8. Not SUre - DO YOU OR ANYONE IN YOUR FAMILY HAVE PREVIOUS HISTORY OF BLOOD CLOTS?    8. NO - Do you or anyone in your family have previous history of blood clots?  9. NO - Do you or does anyone in your family have a serious bleeding problem such as prolonged bleeding following surgeries or cuts?  10. NO - Have you ever had problems with anemia or been told to take iron pills?  11. NO - Have you had any abnormal blood loss such as  black, tarry or bloody stools, or abnormal vaginal bleeding?  12. NO - Have you ever had a blood transfusion?  13. NO - Have you or any of your relatives ever had problems with anesthesia?  14. NO - Do you have sleep apnea, excessive snoring or daytime drowsiness?  15. NO - Do you have any prosthetic heart valves?  16. NO - Do you have prosthetic joints?  17. NO - Is there any chance that you may be pregnant?      HPI:     HPI related to upcoming procedure:  67 y/o F with h/o cleft palate, anxiety, HTN, scoliosis, spinal stenosis, osteoporosis (not taking Fosamax), mild intermittent asthma.   Lower denture fittings no longer holding due to bone loss, will have implants placed.       ASTHMA - Patient has a longstanding history of moderate-severe Asthma . Patient has been doing well overall noting NO SYMPTOMS and continues on medication regimen consisting of prn albuterol without adverse reactions or side effects.     HYPERTENSION - Patient has longstanding history of HTN , currently denies any symptoms referable to elevated blood pressure. Specifically denies chest pain, palpitations, dyspnea, orthopnea, PND or peripheral edema. Blood pressure readings have been in normal range. Current medication regimen is as listed below. Patient denies any side effects of medication.       MEDICAL HISTORY:     Patient Active Problem List    Diagnosis Date Noted     Controlled substance agreement signed 08/28/2018     Priority: Medium     Patient is followed by SANTIAGO MORLEY for ongoing prescription of benzodiazepines.  All refills should be approved by this provider, or covering partner.    Medication(s): xanax.   Maximum quantity per month: 60  (gets 180 at a time for a 3 month supply)  Clinic visit frequency required: Q 6  months     Controlled substance agreement on file: Yes       Date(s): 8/28/18  Benzodiazepine use reviewed by psychiatry:  No    Last [facility] website verification:  done on 8/28/18    [email or web address]           Mild intermittent asthma without complication 03/08/2016     Priority: Medium     Fracture of distal end of radius and ulna, right, closed, with routine healing, subsequent encounter 12/22/2015     Priority: Medium     Gastroesophageal reflux disease without esophagitis 09/15/2015     Priority: Medium     Osteoporosis 06/16/2015     Priority: Medium     Anxiety 03/13/2012     Priority: Medium     Scoliosis deformity of spine 09/07/2011     Priority: Medium     Spinal stenosis 09/07/2011     Priority: Medium     Advanced directives, counseling/discussion 07/25/2011     Priority: Medium     Advance Directive Problem List Overview:   Name Relationship Phone    Primary Health Care Agent            Alternative Health Care Agent          Discussed advance care planning with patient; information given to patient to review. 7/25/2011          ASCUS on Pap smear 07/06/2011     Priority: Medium     7/8/10 pap ASCUS neg HPV.  Per protocol, repeat screening pap in 1 year (7/2011)  4/11/12 pap NIL. Plan-- routine screening       Hypertension goal BP (blood pressure) < 140/90      Priority: Medium     CARDIOVASCULAR SCREENING; LDL GOAL LESS THAN 130 10/31/2010     Priority: Medium      Past Medical History:   Diagnosis Date     Anxiety      Cleft palate     infections     GERD (gastroesophageal reflux disease)      Hypertension goal BP (blood pressure) < 140/90      RBBB (right bundle branch block)      SBO (small bowel obstruction) (H) 9-03     Spinal stenosis      Stress incontinence      Weight loss     unclear etiology.  workup negative     Past Surgical History:   Procedure Laterality Date     C JACOB NOSE/CLEFT LIP/TIP  childhood     DECOMPRESSION LUMBAR ONE LEVEL  2002    l4/5  -- Dr Mckinley     HC DILATION/CURETTAGE DIAG/THER NON OB       HYSTERECTOMY, CERVIX STATUS UNKNOWN       Current Outpatient Medications   Medication Sig Dispense Refill     albuterol 90 MCG/ACT inhaler Inhale  "1-2 puffs into the lungs every 6 hours as needed for shortness of breath / dyspnea. 1 Inhaler 4     alendronate (FOSAMAX) 70 MG tablet Take 1 tablet (70 mg) by mouth every 7 days Take with over 8 ounces water and stay upright for at least 30 minutes after dose.  Take at least 60 minutes before breakfast (Patient not taking: Reported on 11/13/2018) 12 tablet 3     ALPRAZolam (XANAX) 0.25 MG tablet TAKE 1 TABLET BY MOUTH THREE TIMES DAILY AS NEEDED FOR ANXIETY 90 tablet 1     amLODIPine (NORVASC) 5 MG tablet Take 1 tablet (5 mg) by mouth daily 90 tablet 3     atenolol (TENORMIN) 50 MG tablet TAKE 1 TABLET(50 MG) BY MOUTH DAILY 90 tablet 1     calcium carbonate (TUMS E-X 750) 750 MG CHEW Take 750 mg by mouth daily.       cloNIDine (CATAPRES) 0.1 MG tablet TAKE 1 TABLET BY MOUTH THREE TIMES DAILY 270 tablet 0     ferrous sulfate (SLO-FE) 142 (45 Fe) MG TBCR Take 1 tablet (142 mg) by mouth daily 90 tablet 1     loratadine 10 MG capsule Take 10 mg by mouth daily 90 capsule 3     meclizine (ANTIVERT) 25 MG tablet TAKE 1 TABLET(25 MG) BY MOUTH EVERY 6 HOURS AS NEEDED (Patient not taking: Reported on 11/13/2018) 24 tablet 0     mometasone (NASONEX) 50 MCG/ACT nasal spray Spray 2 sprays into both nostrils daily. 3 Box 3     Multiple Vitamins-Minerals (ONE-A-DAY WOMENS 50 PLUS PO) Take  by mouth daily.       quinapril (ACCUPRIL) 20 MG tablet TAKE 1/2 TABLET BY MOUTH DAILY 45 tablet 1     sertraline (ZOLOFT) 25 MG tablet TAKE 1 TABLET(25 MG) BY MOUTH EVERY DAY 90 tablet 0     STATIN NOT PRESCRIBED, INTENTIONAL, Please choose reason not prescribed, below       vitamin B complex with vitamin C (STRESS TAB) TABS tablet Take 1 tablet by mouth daily       OTC products: None, except as noted above    Allergies   Allergen Reactions     Citalopram Other (See Comments)     Dry mouth, pressures in chest  And nose congestion. Patient also had light headed and nausea     No Clinical Screening - See Comments      \"I'm allergic to Laughing " "Gas at the Dentist\"     Penicillins      Pravastatin      Aches.      Triamterene-Hctz [Hydrochlorothiazide W/Triamterene]       Latex Allergy: NO    Social History     Tobacco Use     Smoking status: Former Smoker     Smokeless tobacco: Never Used   Substance Use Topics     Alcohol use: Yes     Comment: seldom     History   Drug Use No       REVIEW OF SYSTEMS:   CONSTITUTIONAL: NEGATIVE for fever, chills, change in weight  ENT/MOUTH: NEGATIVE for ear, mouth and throat problems  RESP: NEGATIVE for significant cough or SOB  CV: NEGATIVE for chest pain, palpitations or peripheral edema  GI: NEGATIVE for nausea, abdominal pain, heartburn, or change in bowel habits  MUSCULOSKELETAL:  More back pain lately    EXAM:   /65 (BP Location: Right arm, Patient Position: Sitting, Cuff Size: Adult Small)   Pulse 73   Temp 98.1  F (36.7  C) (Oral)   Ht 1.524 m (5')   Wt 45.1 kg (99 lb 8 oz)   SpO2 98%   BMI 19.43 kg/m    GENERAL APPEARANCE: healthy, alert and no distress  HENT: ear canals and TM's normal and nose and mouth without ulcers or lesions  HENT: no dentures bottom, she has top dentures.  Micrognathia.  Nasal speech  [place]  RESP: lungs clear to auscultation - no rales, rhonchi or wheezes  CV: regular rate and rhythm, normal S1 S2, no S3 or S4 and no murmur, click or rub   ABDOMEN: soft, nontender, no HSM or masses and bowel sounds normal  MS: extremities normal- no gross deformities noted      scoliosis  NEURO: Normal strength and tone, sensory exam grossly normal, mentation intact and speech normal    DIAGNOSTICS:   EKG: appears normal, NSR, Rsr' in lead 1, normal axis, normal intervals, no acute ST/T changes c/w ischemia, no LVH by voltage criteria, unchanged from previous tracings    Recent Labs   Lab Test 11/13/18  0746 08/28/18  1044  03/08/16  1104   HGB  --  12.5  --  12.1   PLT  --  219  --  189    141   < > 140   POTASSIUM 3.9 3.6   < > 4.2   CR 0.67 0.62   < > 0.62    < > = values in this " interval not displayed.        IMPRESSION:   Reason for surgery/procedure: implants for lower jaw  Diagnosis/reason for consult: med clearance    The proposed surgical procedure is considered LOW risk.    REVISED CARDIAC RISK INDEX  The patient has the following serious cardiovascular risks for perioperative complications such as (MI, PE, VFib and 3  AV Block):  No serious cardiac risks  INTERPRETATION: 0 risks: Class I (very low risk - 0.4% complication rate)    The patient has the following additional risks for perioperative complications:  She is anxious and responds well to reassurance/redirection/communication  She is [place], keep that in mind.       ICD-10-CM    1. Preop general physical exam Z01.818    2. Idiopathic hypotension I95.0 EKG 12-lead complete w/read - Clinics     CBC with platelets   3. Hypertension goal BP (blood pressure) < 140/90 I10 BASIC METABOLIC PANEL   4. Mild intermittent asthma without complication J45.20    5. Cleft palate Q35.9    6. Encounter for screening mammogram for breast cancer Z12.31 MA SCREENING DIGITAL BILAT - Future  (s+30)   7. CARDIOVASCULAR SCREENING; LDL GOAL LESS THAN 130 Z13.6 Lipid panel reflex to direct LDL Fasting   8. Special screening for malignant neoplasms, colon Z12.11 Fecal colorectal cancer screen (FIT)   9. Anemia, normocytic normochromic D64.9 Ferritin     Iron and iron binding capacity       Initially, BP was low, rechecked it was normal.  Initial read likely aberrant.     RECOMMENDATIONS:       Cardiovascular Risk  Performs 4 METs exercise without symptoms (Climb a flight of stairs) .       Pulmonary Risk  H/o asthma, FYI    No issues for quite some time.       --Patient is to take all scheduled medications on the day of surgery EXCEPT for modifications listed below.    -- I suspect her dry cough may be ACE inh cough side effect.  Will have her hold quinapril and return to clinic in 3-4 weeks.     APPROVAL GIVEN to proceed with proposed procedure, without  further diagnostic evaluation     Later entry:  Mild anemia   Will add iron studies.  FIT pending    Signed Electronically by: Dora Fierro MD    Copy of this evaluation report is provided to requesting physician.    [place] Preop Guidelines    Revised Cardiac Risk Index

## 2019-10-15 NOTE — LETTER
[facility]  [address] [place]  [address]  [phone]        October 16, 2019    Holly SHELTON Super  [address]   [facility] [address]        Dear Holly,    Enclosed are your results.  Your labs are normal/stable at this time.   There is a mild anemia: will watch that but we don't need to delay the oral surgery.  Please complete the stool test.         Please call  with any questions.  We can also discuss any questions regarding these labs at your next scheduled visit.     Results for orders placed or performed in visit on 10/15/19   Lipid panel reflex to direct LDL Fasting   Result Value Ref Range    Cholesterol 215 (H) <200 mg/dL    Triglycerides 57 <150 mg/dL    HDL Cholesterol 70 >49 mg/dL    LDL Cholesterol Calculated 134 (H) <100 mg/dL    Non HDL Cholesterol 145 (H) <130 mg/dL   BASIC METABOLIC PANEL   Result Value Ref Range    Sodium 140 133 - 144 mmol/L    Potassium 4.0 3.4 - 5.3 mmol/L    Chloride 106 94 - 109 mmol/L    Carbon Dioxide 28 20 - 32 mmol/L    Anion Gap 6 3 - 14 mmol/L    Glucose 103 (H) 70 - 99 mg/dL    Urea Nitrogen 10 7 - 30 mg/dL    Creatinine 0.63 0.52 - 1.04 mg/dL    GFR Estimate >90 >60 mL/min/[1.73_m2]    GFR Estimate If Black >90 >60 mL/min/[1.73_m2]    Calcium 9.1 8.5 - 10.1 mg/dL   CBC with platelets   Result Value Ref Range    WBC 7.2 4.0 - 11.0 10e9/L    RBC Count 3.98 3.8 - 5.2 10e12/L    Hemoglobin 11.5 (L) 11.7 - 15.7 g/dL    Hematocrit 35.7 35.0 - 47.0 %    MCV 90 78 - 100 fl    MCH 28.9 26.5 - 33.0 pg    MCHC 32.2 31.5 - 36.5 g/dL    RDW 13.7 10.0 - 15.0 %    Platelet Count 206 150 - 450 10e9/L   Ferritin   Result Value Ref Range    Ferritin 118 8 - 252 ng/mL   Iron and iron binding capacity   Result Value Ref Range    Iron 86 35 - 180 ug/dL    Iron Binding Cap 285 240 - 430 ug/dL    Iron Saturation Index 30 15 - 46 %       If you have any questions please call the clinic at  [phone].    Sincerely,    Dora MCALLISTER

## 2019-10-15 NOTE — PATIENT INSTRUCTIONS
Before Your Surgery      Call your surgeon if there is any change in your health. This includes signs of a cold or flu (such as a sore throat, runny nose, cough, rash or fever).    Do not smoke, drink alcohol or take over the counter medicine (unless your surgeon or primary care doctor tells you to) for the 24 hours before and after surgery.    If you take prescribed drugs: Follow your doctor s orders about which medicines to take and which to stop until after surgery.    Eating and drinking prior to surgery: follow the instructions from your surgeon    Take a shower or bath the night before surgery. Use the soap your surgeon gave you to gently clean your skin. If you do not have soap from your surgeon, use your regular soap. Do not shave or scrub the surgery site.  Wear clean pajamas and have clean sheets on your bed.       Stop Enalapril (quinapril)    See DELFINO Canas next week for BP recheck    Stool test    Mammogram    Return to clinic 3 - 4 months recheck blood pressure.      [employer] Access Code: [email or web address]  Expires: 2/7/2017 12:08 PM    [employer]  A secure, online tool to manage your health information     [employer]® is a secure, online tool that connects you to your personalized health information from the privacy of your home -- day or night - making it very easy for you to manage your healthcare. Once the activation process is completed, you can even access your medical information using the [employer] rhoda, which is available for free in the Apple Rhoda store or Google Play store.     [employer] provides the following levels of access (as shown below):   My Chart Features   [facility] Primary Care Doctor [facility]  Specialists [facility]  Urgent  Care Non-[facility]  Primary Care  Doctor   Email your healthcare team securely and privately 24/7 X X X X   Manage appointments: schedule your next appointment; view details of past/upcoming appointments X      Request prescription refills. X      View recent personal medical records, including lab and immunizations X X X X   View health record, including health history, allergies, medications X X X X   Read reports about your outpatient visits, procedures, consult and ER notes X X X X   See your discharge summary, which is a recap of your hospital and/or ER visit that includes your diagnosis, lab results, and care plan. X X       How to register for [employer]:  1. Go to  [email or web address].  2. Click on the Sign Up Now box, which takes you to the New Member Sign Up page. You will need to provide the following information:  a. Enter your [employer] Access Code exactly as it appears at the top of this page. (You will not need to use this code after you’ve completed the sign-up process. If you do not sign up before the expiration date, you must request a new code.)   b. Enter your date of birth.   c. Enter your home email address.   d. Click Submit, and follow the next screen’s instructions.  3. Create a [employer] ID. This will be your [employer]  login ID and cannot be changed, so think of one that is secure and easy to remember.  4. Create a [employer] password. You can change your password at any time.  5. Enter your Password Reset Question and Answer. This can be used at a later time if you forget your password.   6. Enter your e-mail address. This allows you to receive e-mail notifications when new information is available in [employer].  7. Click Sign Up. You can now view your health information.    For assistance activating your [employer] account, call [phone]

## 2019-10-16 LAB
FERRITIN SERPL-MCNC: 118 NG/ML (ref 8–252)
IRON SATN MFR SERPL: 30 % (ref 15–46)
IRON SERPL-MCNC: 86 UG/DL (ref 35–180)
TIBC SERPL-MCNC: 285 UG/DL (ref 240–430)

## 2019-10-16 PROCEDURE — 36415 COLL VENOUS BLD VENIPUNCTURE: CPT | Performed by: INTERNAL MEDICINE

## 2019-10-16 PROCEDURE — 82728 ASSAY OF FERRITIN: CPT | Performed by: INTERNAL MEDICINE

## 2019-10-16 PROCEDURE — 83540 ASSAY OF IRON: CPT | Performed by: INTERNAL MEDICINE

## 2019-10-16 PROCEDURE — 83550 IRON BINDING TEST: CPT | Performed by: INTERNAL MEDICINE

## 2019-10-16 NOTE — RESULT ENCOUNTER NOTE
Holly Reina    Enclosed are your results.  Your labs are normal/stable at this time.   There is a mild anemia: will watch that but we don't need to delay the oral surgery.  Please complete the stool test.         Please call  with any questions.  We can also discuss any questions regarding these labs at your next scheduled visit.    Sincerely,    Dora Fierro M.D.

## 2019-10-21 ENCOUNTER — TELEPHONE (OUTPATIENT)
Dept: FAMILY MEDICINE | Facility: CLINIC | Age: 69
End: 2019-10-21

## 2019-10-21 DIAGNOSIS — Z12.11 SPECIAL SCREENING FOR MALIGNANT NEOPLASMS, COLON: ICD-10-CM

## 2019-10-21 PROCEDURE — 82274 ASSAY TEST FOR BLOOD FECAL: CPT | Performed by: INTERNAL MEDICINE

## 2019-10-21 NOTE — LETTER
[facility]  [address] [place]  [address]  [phone]        October 28, 2019    Holly Reina  [address]   [facility] [address]        Dear Holly,    Your stool sample is negative, thank you for completing this!!     Results for orders placed or performed in visit on 10/21/19   Fecal colorectal cancer screen (FIT)   Result Value Ref Range    Occult Blood Scn FIT Negative NEG^Negative       If you have any questions please call the clinic at [phone].    Sincerely,    Dora MCALLISTER

## 2019-10-22 ENCOUNTER — ALLIED HEALTH/NURSE VISIT (OUTPATIENT)
Dept: FAMILY MEDICINE | Facility: CLINIC | Age: 69
End: 2019-10-22

## 2019-10-22 VITALS — DIASTOLIC BLOOD PRESSURE: 76 MMHG | SYSTOLIC BLOOD PRESSURE: 141 MMHG | HEART RATE: 73 BPM

## 2019-10-22 DIAGNOSIS — Z01.30 BP CHECK: Primary | ICD-10-CM

## 2019-10-22 PROCEDURE — 99207 ZZC NO CHARGE NURSE ONLY: CPT | Performed by: INTERNAL MEDICINE

## 2019-10-26 LAB — HEMOCCULT STL QL IA: NEGATIVE

## 2019-10-27 NOTE — RESULT ENCOUNTER NOTE
Holly Reina    Your stool sample is negative, thank you for completing this!!     Sincerely,     SANTIAGO MORLEY M.D.

## 2019-11-05 ENCOUNTER — TELEPHONE (OUTPATIENT)
Dept: FAMILY MEDICINE | Facility: CLINIC | Age: 69
End: 2019-11-05

## 2019-11-05 ENCOUNTER — ALLIED HEALTH/NURSE VISIT (OUTPATIENT)
Dept: NURSING | Facility: CLINIC | Age: 69
End: 2019-11-05
Payer: COMMERCIAL

## 2019-11-05 VITALS — SYSTOLIC BLOOD PRESSURE: 128 MMHG | HEART RATE: 76 BPM | DIASTOLIC BLOOD PRESSURE: 72 MMHG

## 2019-11-05 DIAGNOSIS — I10 HYPERTENSION GOAL BP (BLOOD PRESSURE) < 140/90: Primary | ICD-10-CM

## 2019-11-05 PROCEDURE — 99207 ZZC NO CHARGE NURSE ONLY: CPT

## 2019-11-05 RX ORDER — FLUTICASONE PROPIONATE 50 MCG
1 SPRAY, SUSPENSION (ML) NASAL DAILY
COMMUNITY
Start: 2019-11-05 | End: 2023-04-07

## 2019-11-05 NOTE — TELEPHONE ENCOUNTER
Patient returned call to triage line.     RN rescheduled patient for 1 pm    Yuliet Leo RN, BSN, PHN  [facility] [place]

## 2019-11-05 NOTE — TELEPHONE ENCOUNTER
Indications for Blood Pressure monitoring: recently stopped quinapril due to cough    She recently had implant to lower gums area in preparation for future teeth to be placed.   She is on liquid diet, finished her narcotic pain meds and is not having any issues.      BP:   BP Readings from Last 1 Encounters:   11/05/19 128/72     76       Phone encounter routed to [facility] to facilitate communication to patient regarding her concerns:    1) says she would like to switch from nasonex to flonase, could just get this over the counter, says it works better for her but wants to know what [facility] thinks    2) says her cough is improved, does not keep her up at night.   Says she thinks she has a small opening in the roof of her mouth from cleft palate surgery at ages 1 and 2 years that was not completely closed and thinks mucus comes out of there and causes coughing    3)   Can she stay off the quinapril?    4)  Any further follow up advised?    Iveth Wilks RN  [facility]

## 2019-11-05 NOTE — TELEPHONE ENCOUNTER
Okay  With preferred nasal steroid...     Okay to hold quinapril...   Would she come for RN BP recheck in about a month again?

## 2019-11-05 NOTE — TELEPHONE ENCOUNTER
I called patient, advised her of [facility]' plan; she is agreeable, updated Epic with the over the counter flonase, scheduled her for re-check in a month.    Patient verbalized understanding of and agreement with plan.    Iveth Wilks RN  [facility]

## 2019-11-05 NOTE — PROGRESS NOTES
Indications for Blood Pressure monitoring: recently stopped quinapril due to cough    Questioned patient about current smoking habits.  Pt. quit smoking some time ago.     Signs and Symptoms:   Headaches: No  Chest pain: No  Shortness of breath: No  Edema: No  Visual problems: No  Parathesia: No  Epitaxis: No  Dizziness: No    She recently had implant to lower gums area in preparation for future teeth to be placed.   She is on liquid diet, finished her narcotic pain meds and is not having any issues.      BP:   BP Readings from Last 1 Encounters:   11/05/19 128/72     76     Diabetic: No  Heart Disease:  No    Phone encounter routed to [facility] to facilitate communication to patient regarding her concerns:    1) says she would like to switch from nasonex to flonase, could just get this over the counter, says it works better for her but wants to know what [facility] thinks    2) says her cough is improved, does not keep her up at night.   Says she thinks she has a small opening in the roof of her mouth from cleft palate surgery at ages 1 and 2 years that was not completely closed and thinks mucus comes out of there and causes coughing    3)   Can she stay off the quinapril?    4)  Any further follow up advised?    Iveth Wilks RN  [facility]

## 2019-11-05 NOTE — TELEPHONE ENCOUNTER
Patient was no show for RN BP check today.   Recently taken off her quinapril due to cough (see 10/15/ pre-op).    Was advised to re-check BP 3-4 weeks.    BP Readings from Last 3 Encounters:   10/22/19 (!) 141/76   10/15/19 126/65   11/13/18 139/70     Attempted to call patient at home/mobile number, left message on voicemail; patient was instructed to return call to [facility] RN directly on the RN call back line at [phone]     Plan to reschedule RN BP check.      Iveth Wilks, DELFINO  [facility]

## 2019-11-06 DIAGNOSIS — I20.89 ATYPICAL ANGINA (H): ICD-10-CM

## 2019-11-06 DIAGNOSIS — I10 HYPERTENSION GOAL BP (BLOOD PRESSURE) < 140/90: ICD-10-CM

## 2019-11-06 NOTE — TELEPHONE ENCOUNTER
"Requested Prescriptions   Pending Prescriptions Disp Refills     atenolol (TENORMIN) 50 MG tablet [Pharmacy Med Name: ATENOLOL 50MG TABLETS] 90 tablet 0     Sig: TAKE 1 TABLET(50 MG) BY MOUTH DAILY   Last Written Prescription Date:  4/10/19  Last Fill Quantity: 90,  # refills: 1   Last office visit: 10/15/2019 with prescribing provider:     Future Office Visit:   Next 5 appointments (look out 90 days)    Dec 03, 2019 11:00 AM CST  Nurse Only with CP RN  [facility] [address] [place] [address]  [phone]             Beta-Blockers Protocol Passed - 11/6/2019  1:50 PM        Passed - Blood pressure under 140/90 in past 12 months     BP Readings from Last 3 Encounters:   11/05/19 128/72   10/22/19 (!) 141/76   10/15/19 126/65                 Passed - Patient is age 6 or older        Passed - Recent (12 mo) or future (30 days) visit within the authorizing provider's specialty     Patient has had an office visit with the authorizing provider or a provider within the authorizing providers department within the previous 12 mos or has a future within next 30 days. See \"Patient Info\" tab in inbasket, or \"Choose Columns\" in Meds & Orders section of the refill encounter.              Passed - Medication is active on med list        amLODIPine (NORVASC) 5 MG tablet [Pharmacy Med Name: AMLODIPINE BESYLATE 5MG TABLETS] 90 tablet 0     Sig: TAKE 1 TABLET(5 MG) BY MOUTH DAILY   Last Written Prescription Date:  8/28/18  Last Fill Quantity: 90,  # refills: 3   Last office visit: 10/15/2019 with prescribing provider:     Future Office Visit:   Next 5 appointments (look out 90 days)    Dec 03, 2019 11:00 AM CST  Nurse Only with CP RN  [facility] [address] [place] [id number]  [phone]             Calcium Channel Blockers Protocol  Passed - 11/6/2019  " "1:50 PM        Passed - Blood pressure under 140/90 in past 12 months     BP Readings from Last 3 Encounters:   11/05/19 128/72   10/22/19 (!) 141/76   10/15/19 126/65                 Passed - Recent (12 mo) or future (30 days) visit within the authorizing provider's specialty     Patient has had an office visit with the authorizing provider or a provider within the authorizing providers department within the previous 12 mos or has a future within next 30 days. See \"Patient Info\" tab in inbasket, or \"Choose Columns\" in Meds & Orders section of the refill encounter.              Passed - Medication is active on med list        Passed - Patient is age 18 or older        Passed - No active pregnancy on record        Passed - Normal serum creatinine on file in past 12 months     Recent Labs   Lab Test 10/15/19  1029   CR 0.63             Passed - No positive pregnancy test in past 12 months          "

## 2019-11-06 NOTE — TELEPHONE ENCOUNTER
"Requested Prescriptions   Pending Prescriptions Disp Refills     cloNIDine (CATAPRES) 0.1 MG tablet [Pharmacy Med Name: CLONIDINE 0.1MG TABLETS] 270 tablet 0     Sig: TAKE 1 TABLET BY MOUTH THREE TIMES DAILY   Last Written Prescription Date:  7/26/19  Last Fill Quantity: 270,  # refills: 0   Last office visit: 10/15/2019 with prescribing provider:     Future Office Visit:   Next 5 appointments (look out 90 days)    Dec 03, 2019 11:00 AM CST  Nurse Only with CP RN  [facility] [address] [facility] [address]  [phone]             Central Acting Antiadrenergic Agents Passed - 11/6/2019  1:50 PM        Passed - Blood pressure under 140/90 in past 12 months     BP Readings from Last 3 Encounters:   11/05/19 128/72   10/22/19 (!) 141/76   10/15/19 126/65                 Passed - Patient is 6 years of age or older        Passed - Recent (12 mo) or future (30 days) visit within the authorizing provider's specialty     Patient has had an office visit with the authorizing provider or a provider within the authorizing providers department within the previous 12 mos or has a future within next 30 days. See \"Patient Info\" tab in inbasket, or \"Choose Columns\" in Meds & Orders section of the refill encounter.              Passed - Medication is active on med list        Passed - Patient not pregnant        Passed - Normal serum creatinine on file within past 12 months     Recent Labs   Lab Test 10/15/19  1029   CR 0.63             Passed - No positive pregnancy test on file in past 12 months          "

## 2019-11-07 RX ORDER — ATENOLOL 50 MG/1
TABLET ORAL
Qty: 90 TABLET | Refills: 0 | Status: SHIPPED | OUTPATIENT
Start: 2019-11-07 | End: 2020-02-26

## 2019-11-07 RX ORDER — AMLODIPINE BESYLATE 5 MG/1
TABLET ORAL
Qty: 90 TABLET | Refills: 0 | Status: SHIPPED | OUTPATIENT
Start: 2019-11-07 | End: 2020-02-26

## 2019-11-07 RX ORDER — CLONIDINE HYDROCHLORIDE 0.1 MG/1
TABLET ORAL
Qty: 270 TABLET | Refills: 0 | Status: SHIPPED | OUTPATIENT
Start: 2019-11-07 | End: 2020-02-26

## 2019-11-07 NOTE — TELEPHONE ENCOUNTER
Prescription approved per [facility] [place] Refill Protocol.  Iveth Wilks RN  [facility]

## 2019-11-07 NOTE — TELEPHONE ENCOUNTER
Prescription approved per [facility] Refill Protocol.    Yuliet Leo, RN, BSN, PHN  [facility]: [place]

## 2019-12-03 ENCOUNTER — ALLIED HEALTH/NURSE VISIT (OUTPATIENT)
Dept: NURSING | Facility: CLINIC | Age: 69
End: 2019-12-03
Payer: COMMERCIAL

## 2019-12-03 VITALS — DIASTOLIC BLOOD PRESSURE: 70 MMHG | HEART RATE: 80 BPM | SYSTOLIC BLOOD PRESSURE: 124 MMHG

## 2019-12-03 DIAGNOSIS — I10 HYPERTENSION GOAL BP (BLOOD PRESSURE) < 140/90: Primary | ICD-10-CM

## 2019-12-03 DIAGNOSIS — F41.9 ANXIETY: ICD-10-CM

## 2019-12-03 PROCEDURE — 99207 ZZC NO CHARGE NURSE ONLY: CPT

## 2019-12-03 RX ORDER — ALPRAZOLAM 0.25 MG
TABLET ORAL
Qty: 90 TABLET | Refills: 1 | Status: SHIPPED | OUTPATIENT
Start: 2019-12-03 | End: 2022-06-10

## 2019-12-03 NOTE — Clinical Note
Dr. Fierro,I saw Holly for repeat BP check.BP Readings from Last 3 Encounters:12/03/19 : 124/7011/05/19 : 128/7210/22/19 : (!) 141/76Patient is talking rapidly and at length about her ongoing dental issues, having implants placed.   BP under goal on first read after sitting and chatting for 10 minutes or so.She states she is running out of xanax, uses sparingly.Cued up for you to send refill cued in this encounter.She will plan to stay off the quinapril and see you again next fall for routine visit.Thanks!Maxwell Wilks, [facility]

## 2019-12-03 NOTE — PROGRESS NOTES
Indications for Blood Pressure monitoring: stopped quinapril in October due to cough; patient reports helped a bit, has allergy symptoms and phlegm issues chronically    Questioned patient about current smoking habits.  Pt. quit smoking some time ago.     Signs and Symptoms:   Headaches: Yes , nothing unusual; is having dental implant work so thinks is partly due to that  Chest pain: No  Shortness of breath: Yes , occasional due to asthma  Edema: No  Visual problems: No  Parathesia: No  Epitaxis: No  Dizziness: No          BP:   BP Readings from Last 1 Encounters:   12/03/19 124/70     80     Diabetic: No  Heart Disease:  No    Patient is talking rapidly and at length about her ongoing dental issues, having implants placed.     BP under goal on first read after sitting and chatting for 10 minutes or so.    She states she is running out of xanax, uses sparingly.    Routed to provider to send refill cued in this encounter.    She will plan to stay off the quinapril and see [facility] again next fall for routine visit.    Routed to [facility] as FYI and for refill.    Iveth Wilks RN  [facility]

## 2019-12-04 ENCOUNTER — TELEPHONE (OUTPATIENT)
Dept: FAMILY MEDICINE | Facility: CLINIC | Age: 69
End: 2019-12-04

## 2019-12-04 NOTE — TELEPHONE ENCOUNTER
Central Prior Authorization Team   Phone: [phone]    PA Initiation    Medication: Alprazolam 0.25mg  Insurance Company: [employer] - Phone [phone] Fax [phone]  Pharmacy Filling the Rx: [id number] [place] [id number] AT [facility] [address]  Filling Pharmacy Phone: [phone]  Filling Pharmacy Fax: [phone]  Start Date: 12/4/2019

## 2019-12-04 NOTE — TELEPHONE ENCOUNTER
Central Prior Authorization Team   Phone: [phone]    Prior Authorization Approval    Authorization Effective Date: 11/4/2019  Authorization Expiration Date: 12/3/2020  Medication: Alprazolam 0.25mg  Approved Dose/Quantity:   Reference #: [id number]   Insurance Company: [employer] - Phone [phone] Fax [phone]  Expected CoPay:       CoPay Card Available:      Foundation Assistance Needed:    Which Pharmacy is filling the prescription (Not needed for infusion/clinic administered): [id number] - [facility] [address] AT [facility] [address] [facility]  Pharmacy Notified: Yes  Patient Notified: Yes, **Instructed pharmacy to notify patient when script is ready to /ship.**    Approved today  Case Id: [id number]; Status: Approved; Review Type: Prior Auth; Coverage Start Date: 11/04/2019; Coverage End Date: 12/03/2020;

## 2019-12-04 NOTE — TELEPHONE ENCOUNTER
PA is needed for the medication, Alprazolam 0.25mg. Would you like to start PA or Rx alternative medication? If PA, please list all medications this patient has tried along with any contraindications that may have been experienced in the past. Thank you.    Pharmacy: [facility]  Phone: [phone]    Insurance Plan: Jana  Phone:   Pt ID: [id number]  Group:     Forwarded to AMADOU GARZA  for review.

## 2020-01-15 ENCOUNTER — TELEPHONE (OUTPATIENT)
Dept: FAMILY MEDICINE | Facility: CLINIC | Age: 70
End: 2020-01-15

## 2020-01-15 NOTE — LETTER
January 30, 2020    Holly Reina  [address]  [facility] [address]      Dear Holly Reina,     We have tried to contact you about your health, but have been unable to reach you.  Please call us as soon as possible so we can provide you with the best care possible.  We will continue to check in with you throughout the year to complete these items of care, if you are not able to complete these items at this time.  If you would like to complete the missing items for your care, please contact us at [phone].    We recommend the following:  -schedule a MAMMOGRAM 1 in 8 women will develop invasive breast cancer during her lifetime and it is the most common non-skin cancer in American women.  EARLY detection, new treatments, and a better understanding of the disease have increased survival rates - the 5 year survival rate in the 1960s was 63% and today it is close to 90% .  Please disregard this reminder if you have had this exam elsewhere within the last year.  It would be helpful for us to have a copy of your mammogram report in our file so that we can best coordinate your care - please contact us with when your test was done so we can update your record. Please call [phone] to schedule your mammogram today.    -Complete and return the attached ASTHMA CONTROL TEST.  If your total score is 19 or less or you have been to the ER or urgent care for your asthma, then please schedule an asthma followup appointment.        Sincerely,     Your Care Team at [facility]

## 2020-01-15 NOTE — TELEPHONE ENCOUNTER
Panel Management Review      Patient has the following on her problem list:     Asthma review     ACT Total Scores 8/28/2018   ACT TOTAL SCORE -   ASTHMA ER VISITS -   ASTHMA HOSPITALIZATIONS -   ACT TOTAL SCORE (Goal Greater than or Equal to 20) 24   In the past 12 months, how many times did you visit the emergency room for your asthma without being admitted to the hospital? 0   In the past 12 months, how many times were you hospitalized overnight because of your asthma? 0      1. Is Asthma diagnosis on the Problem List? Yes    2. Is Asthma listed on Health Maintenance? Yes    3. Patient is due for:  ACT and AAP      Composite cancer screening  Chart review shows that this patient is due/due soon for the following Mammogram  Summary:    Patient is due/failing the following:   ACT and MAMMOGRAM    Action needed:   Patient needs to do ACT. and mammogram    Type of outreach:    Sent letter. with a ACT and SASE    Questions for provider review:    None                                                                                                                                    Candice Gomes ma       Chart routed to Care Team .

## 2020-01-15 NOTE — LETTER
January 15, 2020    Holly Reina  [address]  [facility] [address]    Dear Holly    We care about your health and have reviewed your health plan. We have reviewed your medical conditions, medication list, and lab results and are making recommendations based on this review, to better manage your health.    You are in particular need of attention regarding:  - Your Asthma  - Scheduling a Breast Cancer Screening (Mammography) [phone]  Please complete the  ACT questionnaire and mail back to the clinic in the self addressed envelope provided, thank you.    Enclosed with this letter is a questionnaire about depression for your upcoming visit. Please fill this out and mail back or contact us. We care about you and want to make sure you get the best care possible. If at any time you feel unsafe or have concerns about safety of others please take  immediate action by calling [phone], for Mental Health Crisis phone support 24 hours a day, 365 days per year. As always, you can also go the your local Emergency Room, or call 911 if you have immediate safety concerns.    Here is a list of Health Maintenance topics that are due now or due soon:  Health Maintenance Due   Topic Date Due     ZOSTER IMMUNIZATION (1 of 2) 12/31/1998     MEDICARE ANNUAL WELLNESS VISIT  07/18/2018     ASTHMA CONTROL TEST  02/28/2019     MAMMO SCREENING  07/31/2019     ASTHMA ACTION PLAN  08/28/2019     FALL RISK ASSESSMENT  08/28/2019     PHQ-2  01/01/2020       Please call us at [phone] (or use [employer]) to address the above recommendations. If we do not hear from you in the next couple of weeks we will be reaching out to you again.    Thank you for trusting [facility] and we appreciate the opportunity to serve you.  We look forward to supporting your healthcare needs in the future.    Healthy Regards,    Dr. Fierro/cw  Team 1

## 2020-01-29 NOTE — TELEPHONE ENCOUNTER
Panel Management Review  Summary:    Type of outreach:    No answer and unable to leave a message.    Encounter routed to No Action Needed.                                                                                                                               Candice Gomes ma

## 2020-02-18 ENCOUNTER — ANCILLARY PROCEDURE (OUTPATIENT)
Dept: MAMMOGRAPHY | Facility: CLINIC | Age: 70
End: 2020-02-18
Payer: COMMERCIAL

## 2020-02-18 DIAGNOSIS — Z12.31 ENCOUNTER FOR SCREENING MAMMOGRAM FOR BREAST CANCER: ICD-10-CM

## 2020-02-18 PROCEDURE — 77067 SCR MAMMO BI INCL CAD: CPT | Mod: TC

## 2020-02-18 ASSESSMENT — ASTHMA QUESTIONNAIRES: ACT_TOTALSCORE: 20

## 2020-02-25 DIAGNOSIS — I10 HYPERTENSION GOAL BP (BLOOD PRESSURE) < 140/90: ICD-10-CM

## 2020-02-25 NOTE — TELEPHONE ENCOUNTER
"Requested Prescriptions   Pending Prescriptions Disp Refills     cloNIDine (CATAPRES) 0.1 MG tablet [Pharmacy Med Name: CLONIDINE 0.1MG TABLETS] 270 tablet 0     Sig: TAKE 1 TABLET BY MOUTH THREE TIMES DAILY   Last Written Prescription Date:  11-7-19  Last Fill Quantity: 270,  # refills: 0   Last office visit: 10/15/2019 with prescribing provider:  ?   Future Office Visit:        Central Acting Antiadrenergic Agents Passed - 2/25/2020  1:01 PM        Passed - Blood pressure under 140/90 in past 12 months     BP Readings from Last 3 Encounters:   12/03/19 124/70   11/05/19 128/72   10/22/19 (!) 141/76                 Passed - Patient is 6 years of age or older        Passed - Recent (12 mo) or future (30 days) visit within the authorizing provider's specialty     Patient has had an office visit with the authorizing provider or a provider within the authorizing providers department within the previous 12 mos or has a future within next 30 days. See \"Patient Info\" tab in inbasket, or \"Choose Columns\" in Meds & Orders section of the refill encounter.              Passed - Medication is active on med list        Passed - Patient not pregnant        Passed - Normal serum creatinine on file within past 12 months     Recent Labs   Lab Test 10/15/19  1029   CR 0.63             Passed - No positive pregnancy test on file in past 12 months          "

## 2020-02-26 RX ORDER — CLONIDINE HYDROCHLORIDE 0.1 MG/1
TABLET ORAL
Qty: 270 TABLET | Refills: 0 | Status: SHIPPED | OUTPATIENT
Start: 2020-02-26 | End: 2020-07-10

## 2020-02-26 NOTE — TELEPHONE ENCOUNTER
Prescription approved per [facility] Refill Protocol.    Yuliet Leo, RN, BSN, PHN  [facility]: [place]

## 2020-03-26 ENCOUNTER — VIRTUAL VISIT (OUTPATIENT)
Dept: FAMILY MEDICINE | Facility: CLINIC | Age: 70
End: 2020-03-26
Payer: COMMERCIAL

## 2020-03-26 DIAGNOSIS — K52.9 GASTROENTERITIS: Primary | ICD-10-CM

## 2020-03-26 DIAGNOSIS — E34.9 HYPERTENSION SECONDARY TO ENDOCRINE DISORDER WITH GOAL BLOOD PRESSURE LESS THAN 140/90: ICD-10-CM

## 2020-03-26 DIAGNOSIS — I20.89 ATYPICAL ANGINA (H): ICD-10-CM

## 2020-03-26 DIAGNOSIS — I15.2 HYPERTENSION SECONDARY TO ENDOCRINE DISORDER WITH GOAL BLOOD PRESSURE LESS THAN 140/90: ICD-10-CM

## 2020-03-26 DIAGNOSIS — I10 HYPERTENSION GOAL BP (BLOOD PRESSURE) < 140/90: ICD-10-CM

## 2020-03-26 DIAGNOSIS — Z87.19 HISTORY OF SMALL BOWEL OBSTRUCTION: ICD-10-CM

## 2020-03-26 DIAGNOSIS — N20.0 LEFT RENAL STONE: ICD-10-CM

## 2020-03-26 PROCEDURE — 99214 OFFICE O/P EST MOD 30 MIN: CPT | Mod: TEL | Performed by: INTERNAL MEDICINE

## 2020-03-26 RX ORDER — ATENOLOL 50 MG/1
50 TABLET ORAL
Qty: 180 TABLET | Refills: 3 | Status: SHIPPED | OUTPATIENT
Start: 2020-03-26 | End: 2021-02-01

## 2020-03-26 RX ORDER — AMLODIPINE BESYLATE 5 MG/1
5 TABLET ORAL 2 TIMES DAILY
Qty: 180 TABLET | Refills: 0 | Status: SHIPPED | OUTPATIENT
Start: 2020-03-26 | End: 2020-10-16

## 2020-03-26 NOTE — PROGRESS NOTES
"Holly Reina is a 69 year old female who is being evaluated via a billable telephone visit.      The patient has been notified of following:     \"This telephone visit will be conducted via a call between you and your physician/provider. We have found that certain health care needs can be provided without the need for a physical exam.  This service lets us provide the care you need with a short phone conversation.  If a prescription is necessary we can send it directly to your pharmacy.  If lab work is needed we can place an order for that and you can then stop by our lab to have the test done at a later time.    If during the course of the call the physician/provider feels a telephone visit is not appropriate, you will not be charged for this service.\"     Holly Reina complains of No chief complaint on file.      I have reviewed and updated the patient's Past Medical History, Social History, Family History and Medication List.    ALLERGIES  Citalopram; No clinical screening - see comments; Penicillins; Pravastatin; Quinapril; and Triamterene-hctz [hydrochlorothiazide w/triamterene]    ED/UC Followup:    Facility:  [facility]   Date of visit: 3/20/20  Reason for visit: Abdominal pain  Current Status: Patient stated it feels better than what it was. No longer throwing up or having diarrhea. Wanting to know about her CT scan.     Jana Flores MA  Per ED on 3/20/2020:   Had an episode of nausea vomiting and diarrhea last weekend and has been having some abdominal pain the past few days......she was vomiting and had episodes of diarrhea on the March 12th-15th. She denies any blood in her stool.....   /118 and  at time of presentation...     Imaging:  CT Abdomen Pelvis W:  1.  Moderate diverticulosis.  2.  There is a 5 mm nonobstructing intrarenal calculus in the left kidney.  3.  The spleen is atrophic. This atrophy appears chronic given the  calcifications within it. The etiology of the atrophy is not " clear. The  remaining splenic parenchyma is enhancing and therefore the remaining parenchyma  is not infarcted.  4.  There is moderate intervertebral disc space disease at multiple lumbar  vertebral levels.    EKG: sinus tachy, o/w normal.   ISTAT EC8: Glucose 128 (High), BUN 7 (Low), Potassium 3.2 (Low) o/w WNL   ISTAT Creatinine: 0.60 WNL   CBC: RBC 3.85 (Low), HGB 11.1 (Low), o/w WNL (WBC 5.3, )  Lactate venous: 1.4 WNL   LFT: WNL   UA (-)    She was given IvF, then oral fluids and tolerated well.     Currently she is doing better, is able to tolerate food and drink.     Assessment/Plan    1. Gastroenteritis  2. History of small bowel obstruction       3. Left renal stone   incidental    4.:  HTN: will increase amlodipine and atenolol to BID...       She likely had a recurrent SBO or a viral gastroenteritis.  She is recovering well.  Discussed the incidental CT findings with her, answered her questions.     At home, her BP have been 140-150 systolic at home.   She does not check that much,  She is taking BP daily  She takes her meds daily    Phone call duration:  16 minutes    Dora Fierro MD

## 2020-07-10 DIAGNOSIS — I10 HYPERTENSION GOAL BP (BLOOD PRESSURE) < 140/90: ICD-10-CM

## 2020-07-10 RX ORDER — CLONIDINE HYDROCHLORIDE 0.1 MG/1
TABLET ORAL
Qty: 270 TABLET | Refills: 0 | Status: SHIPPED | OUTPATIENT
Start: 2020-07-10 | End: 2020-10-16

## 2020-10-16 DIAGNOSIS — I10 HYPERTENSION GOAL BP (BLOOD PRESSURE) < 140/90: ICD-10-CM

## 2020-10-16 RX ORDER — AMLODIPINE BESYLATE 5 MG/1
TABLET ORAL
Qty: 180 TABLET | Refills: 0 | Status: SHIPPED | OUTPATIENT
Start: 2020-10-16 | End: 2021-01-25

## 2020-10-16 RX ORDER — CLONIDINE HYDROCHLORIDE 0.1 MG/1
TABLET ORAL
Qty: 270 TABLET | Refills: 0 | Status: SHIPPED | OUTPATIENT
Start: 2020-10-16 | End: 2021-01-25

## 2021-02-01 ENCOUNTER — TELEPHONE (OUTPATIENT)
Dept: FAMILY MEDICINE | Facility: CLINIC | Age: 71
End: 2021-02-01

## 2021-02-01 NOTE — TELEPHONE ENCOUNTER
Reason for Call:  Other call back    Detailed comments: Patient called and is being offered to get the covid vaccine this Thursday and wants to know if this is a good idea. Patient stated she had a reaction to the flu vaccine so wants to know if this is an issue getting the covid vaccine.    Phone Number Patient can be reached at: Home number on file [phone] (home)    Best Time: Anytime    Can we leave a detailed message on this number? YES    Call taken on 2/1/2021 at 3:24 PM by Grace Nino

## 2021-02-02 ENCOUNTER — TELEPHONE (OUTPATIENT)
Dept: FAMILY MEDICINE | Facility: CLINIC | Age: 71
End: 2021-02-02

## 2021-02-02 NOTE — TELEPHONE ENCOUNTER
Reason for Call:  Other call back    Detailed comments: Patient has an opportunity to get the covid-19 vaccine from group home on 2/4/21. She was wondering if she had a reaction last year to the flu shot (hot flashes, diarrhea, vomiting)     Phone Number Patient can be reached at: Home number on file [phone] (home)    Best Time: afternoon    Can we leave a detailed message on this number? YES    Call taken on 2/2/2021 at 1:28 PM by Jojo King

## 2021-02-02 NOTE — TELEPHONE ENCOUNTER
Message left for patient to return call.   What type of reaction and when did patient have reaction to flu vaccine?  Heather Huggins MA

## 2021-02-03 NOTE — TELEPHONE ENCOUNTER
See other telephone message.  This has been taken care of.    Cherelle LUKEN-RN  Triage Nurse  [facility]: [facility]

## 2021-02-03 NOTE — TELEPHONE ENCOUNTER
She should get the vaccine.  She may have mild fever / aches after the first or second dose.  This is better than the virus itself

## 2021-05-19 NOTE — TELEPHONE ENCOUNTER
Received the following message from Dr Fierro:    Please check     Quality 226: Preventive Care And Screening: Tobacco Use: Screening And Cessation Intervention: Patient screened for tobacco use and is an ex/non-smoker Quality 130: Documentation Of Current Medications In The Medical Record: Current Medications Documented Detail Level: Detailed Quality 431: Preventive Care And Screening: Unhealthy Alcohol Use - Screening: Patient screened for unhealthy alcohol use using a single question and scores less than 2 times per year

## 2021-12-06 NOTE — TELEPHONE ENCOUNTER
"Requested Prescriptions   Pending Prescriptions Disp Refills     meclizine (ANTIVERT) 25 MG tablet [Pharmacy Med Name: MECLIZINE 25MG RX TABLETS] 24 tablet 0    Last Written Prescription Date:  7/18/17  Last Fill Quantity: 24,  # refills: 1   Last office visit: 7/18/2017 with prescribing provider:     Future Office Visit:     Sig: TAKE 1 TABLET(25 MG) BY MOUTH EVERY 6 HOURS AS NEEDED     Antivertigo/Antiemetic Agents Passed    7/10/2018 12:29 PM       Passed - Recent (12 mo) or future (30 days) visit within the authorizing provider's specialty    Patient had office visit in the last 12 months or has a visit in the next 30 days with authorizing provider or within the authorizing provider's specialty.  See \"Patient Info\" tab in inbasket, or \"Choose Columns\" in Meds & Orders section of the refill encounter.           Passed - Patient is 18 years of age or older        cloNIDine (CATAPRES) 0.1 MG tablet [Pharmacy Med Name: CLONIDINE 0.1MG TABLETS] 270 tablet 0    Last Written Prescription Date:  6/14/17  Last Fill Quantity: 270,  # refills: 3   Last office visit: 7/18/2017 with prescribing provider:     Future Office Visit:     Sig: TAKE 1 TABLET(0.1 MG) BY MOUTH THREE TIMES DAILY    Central Acting Antiadrenergic Agents Failed    7/10/2018 12:29 PM       Failed - Normal serum creatinine on file within past 12 months    Recent Labs   Lab Test  06/20/17   1105   CR  0.62            Passed - Blood pressure under 140/90 in past 12 months    BP Readings from Last 3 Encounters:   07/18/17 114/63   03/08/16 106/60   09/15/15 137/63                Passed - Patient is 6 years of age or older       Passed - Recent (12 mo) or future (30 days) visit within the authorizing provider's specialty    Patient had office visit in the last 12 months or has a visit in the next 30 days with authorizing provider or within the authorizing provider's specialty.  See \"Patient Info\" tab in inbasket, or \"Choose Columns\" in Meds & Orders section of " "the refill encounter.           Passed - Patient not pregnant       Passed - No positive pregnancy test on file in past 12 months        sertraline (ZOLOFT) 25 MG tablet [Pharmacy Med Name: SERTRALINE 25MG TABLETS] 90 tablet 0    Last Written Prescription Date:  6/16/17  Last Fill Quantity: 90,  # refills: 3   Last office visit: 7/18/2017 with prescribing provider:     Future Office Visit:     Sig: TAKE 1 TABLET(25 MG) BY MOUTH EVERY DAY    SSRIs Protocol Passed    7/10/2018 12:29 PM       Passed - Recent (12 mo) or future (30 days) visit within the authorizing provider's specialty    Patient had office visit in the last 12 months or has a visit in the next 30 days with authorizing provider or within the authorizing provider's specialty.  See \"Patient Info\" tab in inbasket, or \"Choose Columns\" in Meds & Orders section of the refill encounter.           Passed - Patient is age 18 or older       Passed - No active pregnancy on record       Passed - No positive pregnancy test in last 12 months          " No

## 2022-06-08 ENCOUNTER — TELEPHONE (OUTPATIENT)
Dept: FAMILY MEDICINE | Facility: CLINIC | Age: 72
End: 2022-06-08
Payer: COMMERCIAL

## 2022-06-08 ENCOUNTER — NURSE TRIAGE (OUTPATIENT)
Dept: NURSING | Facility: CLINIC | Age: 72
End: 2022-06-08
Payer: COMMERCIAL

## 2022-06-08 NOTE — TELEPHONE ENCOUNTER
Caller is seeking a follow up appointment for  ED visit 05/20 for weight loss and fatigue; BP ws elevated to 215/98  Caller's symptoms are not improved and current weight is 83 # .   Caller has not checkd BP since and is unable to schedule F/U appt until 06/17/22   Routing to PCP and [facility] triage for  further advisement about sooner appointment or  BP nurse visit  Caller will await call ba chica and will call  for any new or worsening  symptoms      Mikala Marcano RN  FNA   t       Reason for Disposition    Systolic BP  >= 160 OR Diastolic >= 100    Additional Information    Negative: Difficult to awaken or acting confused (e.g., disoriented, slurred speech)    Negative: Severe difficulty breathing (e.g., struggling for each breath, speaks in single words)    Negative: [1] Weakness of the face, arm or leg on one side of the body AND [2] new onset    Negative: [1] Numbness (i.e., loss of sensation) of the face, arm or leg on one side of the body AND [2] new onset    Negative: [1] Chest pain lasts > 5 minutes AND [2] history of heart disease  (i.e., heart attack, bypass surgery, angina, angioplasty, CHF)    Negative: [1] Chest pain AND [2] took nitrogylcerin AND [3] pain was not relieved    Negative: Sounds like a life-threatening emergency to the triager    Negative: Symptom is main concern  (e.g., headache, chest pain)    Negative: Low blood pressure is main concern    Negative: [1] Pregnant > 20 weeks (or postpartum < 6 weeks) AND [2] new hand or face swelling    Negative: [1] Pregnant > 20 weeks AND [2] BP Systolic BP  >= 140 OR Diastolic >= 90    Negative: [1] Systolic BP  >= 200 OR Diastolic >= 120  AND [2] having NO cardiac or neurologic symptoms    Negative: [1] Postpartum < 6 weeks AND [2] BP Systolic BP  >= 140 OR Diastolic >= 90    Negative: [1] Systolic BP  >= 180 OR Diastolic >= 110 AND [2] missed most recent dose of blood pressure medication    Negative: Systolic BP  >= 180 OR Diastolic >= 110     Negative: Ran out of BP medications    Protocols used: HIGH BLOOD PRESSURE-A-AH

## 2022-06-08 NOTE — TELEPHONE ENCOUNTER
Reason for Call:  Other appointment    Detailed comments: Holly is looking to schedule an appointment with Dr. Fierro as soon as she can or if she could either work her into the schedule or recommend another doctor to see her in the mean time. Holly went to [facility] on 5/20/22 and had a high blood pressure of 216/89 patient reports, patient reports her weight has dropped to 83lbs. Can she be worked in or be given a recommendation to schedule with someone else in the meantime?    Phone Number Patient can be reached at: Cell number on file:    Telephone Information:   Mobile [phone]       Best Time: anytime    Can we leave a detailed message on this number? YES    Call taken on 6/8/2022 at 5:19 PM by Jenny Marks

## 2022-06-09 NOTE — TELEPHONE ENCOUNTER
Dr. Fierro has no openings this week.    Patient called.  Appointment scheduled with Dr. Galloway for Friday, June 10th at 10 a.m.  Patient needs to find a ride to the clinic and will call back if not able to make the appointment.    Abbi Gar,

## 2022-06-09 NOTE — TELEPHONE ENCOUNTER
See other triage encounter.  Dr. Fierro has no openings this week.    Patient is scheduled to see Dr. Galloway tomorrow, June 10th at 10 a.m.    Abbi Gar,

## 2022-06-10 ENCOUNTER — TELEPHONE (OUTPATIENT)
Dept: FAMILY MEDICINE | Facility: CLINIC | Age: 72
End: 2022-06-10

## 2022-06-10 ENCOUNTER — OFFICE VISIT (OUTPATIENT)
Dept: INTERNAL MEDICINE | Facility: CLINIC | Age: 72
End: 2022-06-10
Payer: COMMERCIAL

## 2022-06-10 VITALS
HEART RATE: 97 BPM | SYSTOLIC BLOOD PRESSURE: 142 MMHG | DIASTOLIC BLOOD PRESSURE: 84 MMHG | HEIGHT: 60 IN | TEMPERATURE: 99 F | RESPIRATION RATE: 17 BRPM | WEIGHT: 84.6 LBS | OXYGEN SATURATION: 99 % | BODY MASS INDEX: 16.61 KG/M2

## 2022-06-10 DIAGNOSIS — Z78.0 ASYMPTOMATIC MENOPAUSAL STATE: ICD-10-CM

## 2022-06-10 DIAGNOSIS — F41.9 ANXIETY: ICD-10-CM

## 2022-06-10 DIAGNOSIS — R63.4 UNINTENTIONAL WEIGHT LOSS: Primary | ICD-10-CM

## 2022-06-10 DIAGNOSIS — Z12.31 SCREENING MAMMOGRAM FOR BREAST CANCER: ICD-10-CM

## 2022-06-10 DIAGNOSIS — Z12.31 VISIT FOR SCREENING MAMMOGRAM: ICD-10-CM

## 2022-06-10 DIAGNOSIS — Z13.6 CARDIOVASCULAR SCREENING; LDL GOAL LESS THAN 130: ICD-10-CM

## 2022-06-10 DIAGNOSIS — Z12.11 SCREEN FOR COLON CANCER: ICD-10-CM

## 2022-06-10 LAB
ALBUMIN SERPL-MCNC: 3.6 G/DL (ref 3.4–5)
ALP SERPL-CCNC: 77 U/L (ref 40–150)
ALT SERPL W P-5'-P-CCNC: 20 U/L (ref 0–50)
ANION GAP SERPL CALCULATED.3IONS-SCNC: 6 MMOL/L (ref 3–14)
AST SERPL W P-5'-P-CCNC: 15 U/L (ref 0–45)
BASOPHILS # BLD AUTO: 0 10E3/UL (ref 0–0.2)
BASOPHILS NFR BLD AUTO: 0 %
BILIRUB SERPL-MCNC: 0.3 MG/DL (ref 0.2–1.3)
BUN SERPL-MCNC: 8 MG/DL (ref 7–30)
CALCIUM SERPL-MCNC: 9.1 MG/DL (ref 8.5–10.1)
CHLORIDE BLD-SCNC: 105 MMOL/L (ref 94–109)
CHOLEST SERPL-MCNC: 207 MG/DL
CO2 SERPL-SCNC: 28 MMOL/L (ref 20–32)
CREAT SERPL-MCNC: 0.57 MG/DL (ref 0.52–1.04)
EOSINOPHIL # BLD AUTO: 0.1 10E3/UL (ref 0–0.7)
EOSINOPHIL NFR BLD AUTO: 2 %
ERYTHROCYTE [DISTWIDTH] IN BLOOD BY AUTOMATED COUNT: 15.6 % (ref 10–15)
ERYTHROCYTE [SEDIMENTATION RATE] IN BLOOD BY WESTERGREN METHOD: 14 MM/HR (ref 0–30)
FASTING STATUS PATIENT QL REPORTED: YES
GFR SERPL CREATININE-BSD FRML MDRD: >90 ML/MIN/1.73M2
GLUCOSE BLD-MCNC: 98 MG/DL (ref 70–99)
HCT VFR BLD AUTO: 35 % (ref 35–47)
HDLC SERPL-MCNC: 83 MG/DL
HGB BLD-MCNC: 11.2 G/DL (ref 11.7–15.7)
LDLC SERPL CALC-MCNC: 115 MG/DL
LYMPHOCYTES # BLD AUTO: 1.8 10E3/UL (ref 0.8–5.3)
LYMPHOCYTES NFR BLD AUTO: 38 %
MCH RBC QN AUTO: 28.5 PG (ref 26.5–33)
MCHC RBC AUTO-ENTMCNC: 32 G/DL (ref 31.5–36.5)
MCV RBC AUTO: 89 FL (ref 78–100)
MONOCYTES # BLD AUTO: 0.4 10E3/UL (ref 0–1.3)
MONOCYTES NFR BLD AUTO: 9 %
NEUTROPHILS # BLD AUTO: 2.5 10E3/UL (ref 1.6–8.3)
NEUTROPHILS NFR BLD AUTO: 52 %
NONHDLC SERPL-MCNC: 124 MG/DL
PLATELET # BLD AUTO: 205 10E3/UL (ref 150–450)
POTASSIUM BLD-SCNC: 3.7 MMOL/L (ref 3.4–5.3)
PROT SERPL-MCNC: 7.7 G/DL (ref 6.8–8.8)
RBC # BLD AUTO: 3.93 10E6/UL (ref 3.8–5.2)
SODIUM SERPL-SCNC: 139 MMOL/L (ref 133–144)
TRIGL SERPL-MCNC: 44 MG/DL
TSH SERPL DL<=0.005 MIU/L-ACNC: 0.85 MU/L (ref 0.4–4)
WBC # BLD AUTO: 4.9 10E3/UL (ref 4–11)

## 2022-06-10 PROCEDURE — 36415 COLL VENOUS BLD VENIPUNCTURE: CPT | Performed by: INTERNAL MEDICINE

## 2022-06-10 PROCEDURE — 80053 COMPREHEN METABOLIC PANEL: CPT | Performed by: INTERNAL MEDICINE

## 2022-06-10 PROCEDURE — 99214 OFFICE O/P EST MOD 30 MIN: CPT | Performed by: INTERNAL MEDICINE

## 2022-06-10 PROCEDURE — 80061 LIPID PANEL: CPT | Performed by: INTERNAL MEDICINE

## 2022-06-10 PROCEDURE — 85652 RBC SED RATE AUTOMATED: CPT | Performed by: INTERNAL MEDICINE

## 2022-06-10 RX ORDER — ALPRAZOLAM 0.25 MG
0.25 TABLET ORAL 2 TIMES DAILY PRN
Qty: 60 TABLET | Refills: 1 | Status: SHIPPED | OUTPATIENT
Start: 2022-06-10 | End: 2023-04-11

## 2022-06-10 ASSESSMENT — ASTHMA QUESTIONNAIRES: ACT_TOTALSCORE: 20

## 2022-06-10 NOTE — LETTER
June 21, 2022      Holly Reina  [address]   [facility] [address]        Dear ,    We are writing to inform you of your test results.    All of these tests are within acceptable limits , things look good !  I think you should have a follow up with Dr. Fierro as we discussed at your appointment  at your appointment          Resulted Orders   Comprehensive metabolic panel (BMP + Alb, Alk Phos, ALT, AST, Total. Bili, TP)   Result Value Ref Range    Sodium 139 133 - 144 mmol/L    Potassium 3.7 3.4 - 5.3 mmol/L    Chloride 105 94 - 109 mmol/L    Carbon Dioxide (CO2) 28 20 - 32 mmol/L    Anion Gap 6 3 - 14 mmol/L    Urea Nitrogen 8 7 - 30 mg/dL    Creatinine 0.57 0.52 - 1.04 mg/dL    Calcium 9.1 8.5 - 10.1 mg/dL    Glucose 98 70 - 99 mg/dL    Alkaline Phosphatase 77 40 - 150 U/L    AST 15 0 - 45 U/L    ALT 20 0 - 50 U/L    Protein Total 7.7 6.8 - 8.8 g/dL    Albumin 3.6 3.4 - 5.0 g/dL    Bilirubin Total 0.3 0.2 - 1.3 mg/dL    GFR Estimate >90 >60 mL/min/1.73m2      Comment:      Effective December 21, 2021 eGFRcr in adults is calculated using the 2021 CKD-EPI creatinine equation which includes age and gender (Leticia jones al., [place], DOI: 10.1056/SULIoq6170824)   TSH with free T4 reflex   Result Value Ref Range    TSH 0.85 0.40 - 4.00 mU/L   ESR: Erythrocyte sedimentation rate   Result Value Ref Range    Erythrocyte Sedimentation Rate 14 0 - 30 mm/hr   Lipid panel reflex to direct LDL Fasting   Result Value Ref Range    Cholesterol 207 (H) <200 mg/dL    Triglycerides 44 <150 mg/dL    Direct Measure HDL 83 >=50 mg/dL    LDL Cholesterol Calculated 115 (H) <=100 mg/dL    Non HDL Cholesterol 124 <130 mg/dL    Patient Fasting > 8hrs? Yes     Narrative    Cholesterol  Desirable:  <200 mg/dL    Triglycerides  Normal:  Less than 150 mg/dL  Borderline High:  150-199 mg/dL  High:  200-499 mg/dL  Very High:  Greater than or equal to 500 mg/dL    Direct Measure HDL  Female:  Greater than or equal to 50  mg/dL   Male:  Greater than or equal to 40 mg/dL    LDL Cholesterol  Desirable:  <100mg/dL  Above Desirable:  100-129 mg/dL   Borderline High:  130-159 mg/dL   High:  160-189 mg/dL   Very High:  >= 190 mg/dL    Non HDL Cholesterol  Desirable:  130 mg/dL  Above Desirable:  130-159 mg/dL  Borderline High:  160-189 mg/dL  High:  190-219 mg/dL  Very High:  Greater than or equal to 220 mg/dL   CBC with platelets and differential   Result Value Ref Range    WBC Count 4.9 4.0 - 11.0 10e3/uL    RBC Count 3.93 3.80 - 5.20 10e6/uL    Hemoglobin 11.2 (L) 11.7 - 15.7 g/dL    Hematocrit 35.0 35.0 - 47.0 %    MCV 89 78 - 100 fL    MCH 28.5 26.5 - 33.0 pg    MCHC 32.0 31.5 - 36.5 g/dL    RDW 15.6 (H) 10.0 - 15.0 %    Platelet Count 205 150 - 450 10e3/uL    % Neutrophils 52 %    % Lymphocytes 38 %    % Monocytes 9 %    % Eosinophils 2 %    % Basophils 0 %    Absolute Neutrophils 2.5 1.6 - 8.3 10e3/uL    Absolute Lymphocytes 1.8 0.8 - 5.3 10e3/uL    Absolute Monocytes 0.4 0.0 - 1.3 10e3/uL    Absolute Eosinophils 0.1 0.0 - 0.7 10e3/uL    Absolute Basophils 0.0 0.0 - 0.2 10e3/uL       If you have any questions or concerns, please call the clinic at the number listed above.       Sincerely,      Marcin Galloway MD/naomi

## 2022-06-10 NOTE — PROGRESS NOTES
Assessment & Plan     Unintentional weight loss  Holly Reina is a delightful elderly woman , interesting individual. She has a markedly reduced underweight body habitus but this has always been the case, what has her worried is that she's dropped even further from a weight of about 100 now down times per month 83 pounds [ Body mass index is 16.52 kg/m . ] and she can't explain why she's lost this additional weight. We spent the bulk of this appointment in review of her history and it is entirely unremarkable for anything suggestive of a pathological process . She feels fine and the entire review of systems is negative and unproductive. Ultimately I recommended the following ; her regular MD os Dr. Fierro from [facility] who has followed this patient for primary care since 1999 and knows the patient far better then I. I advised patient to be scheduled with marlys for a follow up appointment in order to review test results in roughly a month or so.  - REVIEW OF HEALTH MAINTENANCE PROTOCOL ORDERS  - ESR: Erythrocyte sedimentation rate; Future  - CRP, inflammation; Future  - TSH with free T4 reflex; Future  - Comprehensive metabolic panel (BMP + Alb, Alk Phos, ALT, AST, Total. Bili, TP); Future  - CBC with platelets and differential; Future    Screen for colon cancer  One could consider a colonoscopy in this case but patient wishes to do a stool card test only but would agree to colonoscopy if she flunks the card test.  - Fecal colorectal cancer screen FIT - Future (S+30); Future    Visit for screening mammogram  Recommended   - MA SCREENING DIGITAL BILAT - Future  (s+30); Future    Screening mammogram for breast cancer  Recommended   - REVIEW OF HEALTH MAINTENANCE PROTOCOL ORDERS    Asymptomatic menopausal state  Recommended   - DX Hip/Pelvis/Spine; Future    CARDIOVASCULAR SCREENING; LDL GOAL LESS THAN 130  Routine screening   - Lipid panel reflex to direct LDL Fasting; Future    Review  of the result(s) of each unique test - todays tests  32 minutes spent on the date of the encounter doing chart review, history and exam, documentation and further activities per the note        No follow-ups on file.    Marcin Galloway MD  [facility] ESTELLA Barrera is a 71 year old who presents for the following health issues   HPI   ED/UC Followup:  Facility:  [facility]   Date of visit: 5/20/2022  Reason for visit:   Weight loss (Primary Dx);   Bug bite, initial encounter;   HTN (hypertension)     Current Status:  -lower back and feet pain  -discuss atenolol and clonidine  -recommend good multivitamin  -Wondering why she is losing so much weight has been eating a lot more and trying to eat healthier so she is unsure why she is dropping so much weight  -Discuss if its okay to take tylenol     lives [place] [address] of [facility]   215/98 blood pressure and weight is dropping is her concerns    Lower lumbar spine pain and affecting her lags and feet with walking - discussed her history of  pinched nerves left leg, had a surgery , has a cleft lip with a dysatrhic speech, 1950.   History of of lumbar discectomy with [facility] in 2002  Also had a abdominal pelvic CT scan  3/2020    Last face to face encounter with Dr. Fierro was 1- for preoperative history and physical examination for dental implants done by Dr. Urena    Follow up emergency room evaluation from 5-20-22, primary care patient with Dr. Fierro from [facility] [place] -issues of weight loss, bug bite and hypertension - per emergency room     At the emergency room her blood work, exam, and chest xray were normal.   Wt Readings from Last 5 Encounters:   06/10/22 38.4 kg (84 lb 9.6 oz)   10/15/19 45.1 kg (99 lb 8 oz)   11/13/18 45.4 kg (100 lb)   08/28/18 44 kg (97 lb)   07/18/17 43.5 kg (96 lb)     Body mass index is 16.52 kg/m .    Last appointment with  Dora was virtual office visit from 3-26-20 and an emergency room evaluation from 3-20-20    Health Maintenance Due   Topic Date Due     ANNUAL REVIEW OF HM ORDERS  Never done     ZOSTER IMMUNIZATION (1 of 2) Never done     LUNG CANCER SCREENING  04/03/2013     MEDICARE ANNUAL WELLNESS VISIT  07/18/2018     ASTHMA ACTION PLAN  08/28/2019     FALL RISK ASSESSMENT  08/28/2019     ASTHMA CONTROL TEST  07/15/2020     BMP  10/15/2020     LIPID  10/15/2020     COLORECTAL CANCER SCREENING  10/21/2020     DTAP/TDAP/TD IMMUNIZATION (2 - Td or Tdap) 07/25/2021     COVID-19 Vaccine (3 - Booster for Moderna series) 08/04/2021     PHQ-2 (once per calendar year)  01/01/2022     MAMMO SCREENING  02/18/2022      Rather then order Health Maintenance Care Gaps . I will review Healthcare maintenance section / Healthcare Gaps individually with patient / family and see what if anything they are interested to do.       Review of Systems   Constitutional, HEENT, cardiovascular, pulmonary, gi and gu systems are negative, except as otherwise noted.      Objective    BP (!) 142/84 (BP Location: Left arm, Cuff Size: Adult Regular)   Pulse 97   Temp 99  F (37.2  C) (Oral)   Resp 17   Ht 1.524 m (5')   Wt 38.4 kg (84 lb 9.6 oz)   SpO2 99%   BMI 16.52 kg/m    Body mass index is 16.52 kg/m .  Physical Exam   GENERAL: healthy, alert and no distress  EYES: Eyes grossly normal to inspection, PERRL and conjunctivae and sclerae normal  HENT: ear canals and TM's normal, nose and mouth without ulcers or lesions  NECK: no adenopathy, no asymmetry, masses, or scars and thyroid normal to palpation  RESP: lungs clear to auscultation - no rales, rhonchi or wheezes  BREAST: normal without masses, tenderness or nipple discharge and no palpable axillary masses or adenopathy  CV: regular rate and rhythm, normal S1 S2, no S3 or S4, no murmur, click or rub, no peripheral edema and peripheral pulses strong  ABDOMEN: soft, nontender, no hepatosplenomegaly, no  masses and bowel sounds normal  MS: no gross musculoskeletal defects noted, no edema  SKIN: no suspicious lesions or rashes  NEURO: Normal strength and tone, mentation intact and speech normal  PSYCH: mentation appears normal, affect normal/bright    Orders Placed This Encounter   Procedures     REVIEW OF HEALTH MAINTENANCE PROTOCOL ORDERS     MA SCREENING DIGITAL BILAT - Future  (s+30)     DX Hip/Pelvis/Spine     Lipid panel reflex to direct LDL Fasting     Fecal colorectal cancer screen FIT - Future (S+30)     ESR: Erythrocyte sedimentation rate     CRP, inflammation     TSH with free T4 reflex     Comprehensive metabolic panel (BMP + Alb, Alk Phos, ALT, AST, Total. Bili, TP)     CBC with platelets and differential

## 2022-06-10 NOTE — TELEPHONE ENCOUNTER
Attempted to contact pt but no VM box set up, unable to leave a message. Will try again later.    Liv Menezes MA on 6/10/2022 at 2:52 PM

## 2022-06-10 NOTE — PROGRESS NOTES
Follow up emergency room evaluation from 5-20-22, primary care patient with Dr. Fierro from [facility] [place] -issues of weight loss, bug bite and hypertension - per emergency room     Today your blood work, exam, and chest xray are normal. It is very important you follow up with your primary care provider about your weight loss concern and for blood pressure check. COVID-19 will be resulted by tomorrow, if it is positive, you will get a call. If negative, no call.     Continue using the anti-itch cream to your bug bites. Contact pest control once again, or your .     Return to the emergency department for acute or worsening symptoms like we discussed.       Last appointment with Dora was virtual office visit from 3-26-20 and an emergency room evaluation from 3-20-20    Health Maintenance Due   Topic Date Due     ANNUAL REVIEW OF  ORDERS  Never done     ZOSTER IMMUNIZATION (1 of 2) Never done     LUNG CANCER SCREENING  04/03/2013     MEDICARE ANNUAL WELLNESS VISIT  07/18/2018     ASTHMA ACTION PLAN  08/28/2019     FALL RISK ASSESSMENT  08/28/2019     ASTHMA CONTROL TEST  07/15/2020     BMP  10/15/2020     LIPID  10/15/2020     COLORECTAL CANCER SCREENING  10/21/2020     DTAP/TDAP/TD IMMUNIZATION (2 - Td or Tdap) 07/25/2021     COVID-19 Vaccine (3 - Booster for Moderna series) 08/04/2021     PHQ-2 (once per calendar year)  01/01/2022     MAMMO SCREENING  02/18/2022      Rather then order Health Maintenance Care Gaps . I will review Healthcare maintenance section / Healthcare Gaps individually with patient / family and see what if anything they are interested to do.

## 2022-06-10 NOTE — TELEPHONE ENCOUNTER
Patient was seen in clinic today 6/10/2022 to see Dr Ball and she forgot to ask for a prescription (xanax) to calm her nerves she state's she has been so worked up from when she was in the hospital and she hasn't been able to sleep she is constantly worried about what's going on with her weight and BP. She is looking to get more rest and finally get some sleep she has been having troubles sleeping due to anxiety and worrying. She states she doesn't want a medication she has to take forever just something to calm the nerves for a few months.   Please advise   Thank you   LS

## 2022-06-10 NOTE — LETTER
July 21, 2022      Holly Reina  [address]   [facility] [address]        Dear ,    We are writing to inform you of your test results.    All of these tests are within acceptable limits , things look good !          Resulted Orders   Comprehensive metabolic panel (BMP + Alb, Alk Phos, ALT, AST, Total. Bili, TP)   Result Value Ref Range    Sodium 139 133 - 144 mmol/L    Potassium 3.7 3.4 - 5.3 mmol/L    Chloride 105 94 - 109 mmol/L    Carbon Dioxide (CO2) 28 20 - 32 mmol/L    Anion Gap 6 3 - 14 mmol/L    Urea Nitrogen 8 7 - 30 mg/dL    Creatinine 0.57 0.52 - 1.04 mg/dL    Calcium 9.1 8.5 - 10.1 mg/dL    Glucose 98 70 - 99 mg/dL    Alkaline Phosphatase 77 40 - 150 U/L    AST 15 0 - 45 U/L    ALT 20 0 - 50 U/L    Protein Total 7.7 6.8 - 8.8 g/dL    Albumin 3.6 3.4 - 5.0 g/dL    Bilirubin Total 0.3 0.2 - 1.3 mg/dL    GFR Estimate >90 >60 mL/min/1.73m2      Comment:      Effective December 21, 2021 eGFRcr in adults is calculated using the 2021 CKD-EPI creatinine equation which includes age and gender (Leticia et al., [facility], DOI: 10.1056/PQRRzu6385529)   TSH with free T4 reflex   Result Value Ref Range    TSH 0.85 0.40 - 4.00 mU/L   ESR: Erythrocyte sedimentation rate   Result Value Ref Range    Erythrocyte Sedimentation Rate 14 0 - 30 mm/hr   Fecal colorectal cancer screen FIT - Future (S+30)   Result Value Ref Range    Occult Blood Screen FIT Negative Negative   Lipid panel reflex to direct LDL Fasting   Result Value Ref Range    Cholesterol 207 (H) <200 mg/dL    Triglycerides 44 <150 mg/dL    Direct Measure HDL 83 >=50 mg/dL    LDL Cholesterol Calculated 115 (H) <=100 mg/dL    Non HDL Cholesterol 124 <130 mg/dL    Patient Fasting > 8hrs? Yes     Narrative    Cholesterol  Desirable:  <200 mg/dL    Triglycerides  Normal:  Less than 150 mg/dL  Borderline High:  150-199 mg/dL  High:  200-499 mg/dL  Very High:  Greater than or equal to 500 mg/dL    Direct Measure HDL  Female:  Greater than or  equal to 50 mg/dL   Male:  Greater than or equal to 40 mg/dL    LDL Cholesterol  Desirable:  <100mg/dL  Above Desirable:  100-129 mg/dL   Borderline High:  130-159 mg/dL   High:  160-189 mg/dL   Very High:  >= 190 mg/dL    Non HDL Cholesterol  Desirable:  130 mg/dL  Above Desirable:  130-159 mg/dL  Borderline High:  160-189 mg/dL  High:  190-219 mg/dL  Very High:  Greater than or equal to 220 mg/dL   CBC with platelets and differential   Result Value Ref Range    WBC Count 4.9 4.0 - 11.0 10e3/uL    RBC Count 3.93 3.80 - 5.20 10e6/uL    Hemoglobin 11.2 (L) 11.7 - 15.7 g/dL    Hematocrit 35.0 35.0 - 47.0 %    MCV 89 78 - 100 fL    MCH 28.5 26.5 - 33.0 pg    MCHC 32.0 31.5 - 36.5 g/dL    RDW 15.6 (H) 10.0 - 15.0 %    Platelet Count 205 150 - 450 10e3/uL    % Neutrophils 52 %    % Lymphocytes 38 %    % Monocytes 9 %    % Eosinophils 2 %    % Basophils 0 %    Absolute Neutrophils 2.5 1.6 - 8.3 10e3/uL    Absolute Lymphocytes 1.8 0.8 - 5.3 10e3/uL    Absolute Monocytes 0.4 0.0 - 1.3 10e3/uL    Absolute Eosinophils 0.1 0.0 - 0.7 10e3/uL    Absolute Basophils 0.0 0.0 - 0.2 10e3/uL       If you have any questions or concerns, please call the clinic at the number listed above.       Sincerely,      Marcin Galloway MD/naomi

## 2022-06-15 ENCOUNTER — TELEPHONE (OUTPATIENT)
Dept: FAMILY MEDICINE | Facility: CLINIC | Age: 72
End: 2022-06-15
Payer: COMMERCIAL

## 2022-06-15 NOTE — TELEPHONE ENCOUNTER
Plan does not cover ALPRAZolam (XANAX) 0.25 MG tablet. Please log onto [email or web address] to initiate prior authorization or switch to alternative medication.    KEY: [id number]

## 2022-06-17 ENCOUNTER — OFFICE VISIT (OUTPATIENT)
Dept: INTERNAL MEDICINE | Facility: CLINIC | Age: 72
End: 2022-06-17
Payer: COMMERCIAL

## 2022-06-17 VITALS
TEMPERATURE: 97.9 F | BODY MASS INDEX: 16.41 KG/M2 | HEART RATE: 101 BPM | WEIGHT: 84 LBS | DIASTOLIC BLOOD PRESSURE: 90 MMHG | SYSTOLIC BLOOD PRESSURE: 195 MMHG | OXYGEN SATURATION: 98 %

## 2022-06-17 DIAGNOSIS — R63.4 UNINTENTIONAL WEIGHT LOSS: Primary | ICD-10-CM

## 2022-06-17 DIAGNOSIS — M48.061 SPINAL STENOSIS OF LUMBAR REGION, UNSPECIFIED WHETHER NEUROGENIC CLAUDICATION PRESENT: ICD-10-CM

## 2022-06-17 DIAGNOSIS — R63.6 LOW WEIGHT: ICD-10-CM

## 2022-06-17 DIAGNOSIS — F41.9 ANXIETY: ICD-10-CM

## 2022-06-17 DIAGNOSIS — I10 HYPERTENSION GOAL BP (BLOOD PRESSURE) < 140/90: ICD-10-CM

## 2022-06-17 DIAGNOSIS — J45.20 MILD INTERMITTENT ASTHMA WITHOUT COMPLICATION: ICD-10-CM

## 2022-06-17 PROCEDURE — 90471 IMMUNIZATION ADMIN: CPT | Performed by: INTERNAL MEDICINE

## 2022-06-17 PROCEDURE — 99214 OFFICE O/P EST MOD 30 MIN: CPT | Mod: 25 | Performed by: INTERNAL MEDICINE

## 2022-06-17 PROCEDURE — 90714 TD VACC NO PRESV 7 YRS+ IM: CPT | Performed by: INTERNAL MEDICINE

## 2022-06-17 RX ORDER — LOSARTAN POTASSIUM AND HYDROCHLOROTHIAZIDE 25; 100 MG/1; MG/1
1 TABLET ORAL DAILY
Qty: 90 TABLET | Refills: 3 | Status: SHIPPED | OUTPATIENT
Start: 2022-06-17 | End: 2023-04-07

## 2022-06-17 NOTE — PROGRESS NOTES
"  Assessment & Plan     Unintentional weight loss  She has had BMI of 16 - 18 since I met her 20Y ago.  Juanley pathologic.     Low weight   see above     Mild intermittent asthma without complication  No symptoms      Anxiety   baseline.  Prn xanax use has helped tremendously     Hypertension goal BP (blood pressure) < 140/90   will add medication,  Side effects discussed and questions answered. :     - losartan-hydrochlorothiazide (HYZAAR) 100-25 MG tablet; Take 1 tablet by mouth daily    Spinal stenosis of lumbar region, unspecified whether neurogenic claudication present   recurrent symptoms.  Doing HEP at home  Might be open to repeat epidural (had one 2012)   - MR Lumbar Spine w/o Contrast; Future      I spent a total of 30 minutes on the day of the visit.   Time spent doing chart review, history and exam, documentation and further activities per the note             Return in about 3 weeks (around 7/8/2022) for Routine Visit, BP Recheck.    Dora Fierro MD  [facility] SAMANTHA Barrera is a 71 year old, presenting for the following health issues:    Hospital F/U    72 y/o F  h/o cleft palate, anxiety, HTN, scoliosis, spinal stenosis, osteoporosis (not taking Fosamax), mild intermittent asthma.      I have not seen her in 3Y, during which time she lost 20% of TBW  SHe has chronically low BMI.  But now her BMI is 16.x  SHe has cleft palate and needed dental implants 3Y ago in order to fit dentures     Having some L low back pain.  Seems to affect the legs.  \"they won't cooperate\" sometimes.  There is a throbbing in her lower legs too.  H/o Spinal stenosis/compression.  MRI 2011 shows severe degenerative lumbar scoliosis with stenosis.  Injection back then had help.     SHe is doing her HEP she learned in past, it helps some.     Symptoms limit her walking to < 1 mile.  The legs \"get tired\" and there   H/o Lumbar decompression at L4/5 in 2002.       HPI     ED/UC " Followup:    Facility:  [facility]  Date of visit: 5/20/22  Reason for visit: Weight loss (Primary Dx);   Bug bite, initial encounter;   HTN (hypertension)  Current Status:         Review of Systems   Constitutional, HEENT, cardiovascular, pulmonary, gi and gu systems are negative, except as otherwise noted.      Objective    BP (!) 195/90 (BP Location: Left arm, Patient Position: Sitting, Cuff Size: Adult Small)   Pulse 101   Temp 97.9  F (36.6  C) (Oral)   Wt 38.1 kg (84 lb)   SpO2 98%   BMI 16.41 kg/m    There is no height or weight on file to calculate BMI.  Physical Exam   GENERAL: alert, no distress, frail and elderly  EYES: Eyes grossly normal to inspection, PERRL and conjunctivae and sclerae normal  HENT: nasal speech due to cleft palate history  NECK: no adenopathy, no asymmetry, masses, or scars and thyroid normal to palpation  CV: regular rate and rhythm, normal S1 S2, no S3 or S4, no murmur, click or rub, no peripheral edema and peripheral pulses strong  MS: no gross musculoskeletal defects noted, no edema  SKIN: no suspicious lesions or rashes  NEURO: slightly diminished strength and tone, mentation intact   PSYCH: mentation appears normal, affect pressured (baseline)                      .  ..

## 2022-06-17 NOTE — PATIENT INSTRUCTIONS
For Blood Pressure:  Start losartan/hydrochlorothiazide one daily    For back:   keep doing home exercises.      Call to schedule imaging  OR 1 [phone]    MRI        Return to clinic about 3 weeks for close follow up of imaging and Blood pressure.

## 2022-06-20 NOTE — TELEPHONE ENCOUNTER
Central Prior Authorization Team   Phone: [phone]      PA Initiation    Medication: ALPRAZolam (XANAX) 0.25 MG tablet  Insurance Company: [employer] - Phone [phone] Fax [phone]  Pharmacy Filling the Rx: [id number] - [place] [address] AT [facility] [address]  Filling Pharmacy Phone: [phone]  Filling Pharmacy Fax:    Start Date: 6/20/2022

## 2022-06-20 NOTE — TELEPHONE ENCOUNTER
Prior Authorization Approval    Authorization Effective Date: 5/21/2022  Authorization Expiration Date: 6/20/2023  Medication: ALPRAZolam (XANAX) 0.25 MG tablet-APPROVED  Approved Dose/Quantity:   Reference #:     Insurance Company: [employer] - Phone [phone] Fax [phone]  Expected CoPay:       CoPay Card Available:      Foundation Assistance Needed:    Which Pharmacy is filling the prescription (Not needed for infusion/clinic administered): [id number] - [facility] [address] AT [facility] [address] [facility]  Pharmacy Notified: Yes  Patient Notified: No    **Instructed pharmacy to notify patient when script is ready to /ship.**

## 2022-06-28 ENCOUNTER — OFFICE VISIT (OUTPATIENT)
Dept: INTERNAL MEDICINE | Facility: CLINIC | Age: 72
End: 2022-06-28
Payer: COMMERCIAL

## 2022-06-28 VITALS
HEART RATE: 100 BPM | SYSTOLIC BLOOD PRESSURE: 178 MMHG | TEMPERATURE: 97.7 F | OXYGEN SATURATION: 99 % | WEIGHT: 84 LBS | BODY MASS INDEX: 16.41 KG/M2 | DIASTOLIC BLOOD PRESSURE: 80 MMHG

## 2022-06-28 DIAGNOSIS — M48.061 SPINAL STENOSIS OF LUMBAR REGION, UNSPECIFIED WHETHER NEUROGENIC CLAUDICATION PRESENT: ICD-10-CM

## 2022-06-28 DIAGNOSIS — I10 HYPERTENSION GOAL BP (BLOOD PRESSURE) < 140/90: Primary | ICD-10-CM

## 2022-06-28 DIAGNOSIS — M81.0 AGE-RELATED OSTEOPOROSIS WITHOUT CURRENT PATHOLOGICAL FRACTURE: ICD-10-CM

## 2022-06-28 DIAGNOSIS — F41.9 ANXIETY: ICD-10-CM

## 2022-06-28 DIAGNOSIS — R63.6 LOW WEIGHT: ICD-10-CM

## 2022-06-28 PROCEDURE — 99213 OFFICE O/P EST LOW 20 MIN: CPT | Performed by: INTERNAL MEDICINE

## 2022-06-28 NOTE — PROGRESS NOTES
"  Assessment & Plan     Hypertension goal BP (blood pressure) < 140/90  Did not add los/hct yet.   Feared it had penicillin in it ...   Discussed at length, patient reassured.  Recheck w/ancillary soon      Spinal stenosis of lumbar region, unspecified whether neurogenic claudication present   MRI pending  Patient is fearful of any suggested remedies I mentioned today  HEP for now, will discuss at next visit.     Age-related osteoporosis without current pathological fracture       Anxiety   she has xanax prn added, has not started yet  This really helped her in past.       Low WEight:  Baseline.   Exacerbated by dental work past year (several teeth pulled)        No LOS data to display   Time spent doing chart review, history and exam, documentation and further activities per the note             Return in 31 days (on 7/29/2022) for Lab Work, BP Recheck.    Dora Fierro MD  [facility] [place] Barrera is a 71 year old, presenting for the following health issues:  Hospital F/U     72 y/o F here for f/u HTN    H/o  cleft palate, anxiety, HTN, scoliosis, spinal stenosis, osteoporosis (not taking Fosamax), mild intermittent asthma    Last week, started Losartan/HCT. She did not start it yet because she \"wants to get the tests done first\"       Due to recurrence of Lumbar radiculopathy, MRI scheduled this week, she is agreeable to injection if appropriate.     She is also concerned about her weight, too.       HPI     Follow up on weight and BP           Review of Systems   Constitutional, HEENT, cardiovascular, pulmonary, gi and gu systems are negative, except as otherwise noted.      Objective    BP (!) 178/80 (BP Location: Right arm, Patient Position: Sitting, Cuff Size: Adult Small)   Pulse 100   Temp 97.7  F (36.5  C) (Oral)   Wt 38.1 kg (84 lb)   SpO2 99%   BMI 16.41 kg/m    There is no height or weight on file to calculate BMI.  Physical Exam   GENERAL: alert, no distress " and elderly  EYES: Eyes grossly normal to inspection, PERRL and conjunctivae and sclerae normal  HENT:cleft palate deformity (vocally apparent), micrognathia, several pulled teeth   NECK: no adenopathy, no asymmetry, masses, or scars and thyroid normal to palpation  MS: no gross musculoskeletal defects noted, no edema  NEURO: Normal strength and tone, mentation intact and speech fluent  PSYCH: mentation appears normal, affect anxious/pressured.   LYMPH: no cervical, supraclavicular, axillary, or inguinal adenopathy                     .  ..

## 2022-06-28 NOTE — PATIENT INSTRUCTIONS
Okay to cut the losartan/hct in half  for the first week, then increase    Recheck blood pressure with clinic early July (our staff will help)

## 2022-06-30 ENCOUNTER — ANCILLARY PROCEDURE (OUTPATIENT)
Dept: MRI IMAGING | Facility: CLINIC | Age: 72
End: 2022-06-30
Attending: INTERNAL MEDICINE
Payer: COMMERCIAL

## 2022-06-30 DIAGNOSIS — M48.061 SPINAL STENOSIS OF LUMBAR REGION, UNSPECIFIED WHETHER NEUROGENIC CLAUDICATION PRESENT: ICD-10-CM

## 2022-06-30 PROCEDURE — 72148 MRI LUMBAR SPINE W/O DYE: CPT | Mod: TC | Performed by: RADIOLOGY

## 2022-06-30 NOTE — LETTER
July 5, 2022      Holly Reina  [address]   [facility] [address]        Dear Holly:    Here is the MRI report.  This does not show us what we did not already know.  This will help the pain team make recommendations.       Resulted Orders   MR Lumbar Spine w/o Contrast    Narrative    MRI LUMBAR SPINE WITHOUT CONTRAST   6/30/2022 3:16 PM     HISTORY: Spinal stenosis, lumbosacral. Lumbar radiculopathy.    TECHNIQUE: Multiplanar multisequence MRI of the lumbar spine without  contrast.     COMPARISON: Lumbar spine MR 11/19/2014.     FINDINGS: There are 5 lumbar-type vertebral bodies assumed for the  purposes of this dictation. The distal spinal cord and cauda equina  appear normal.     Convex left curvature of the lumbar spine centered at L2. Rightward  listhesis of T12 on L1. Mild grade 1 anterolisthesis of L4 on L5.  Chronic-appearing compression deformity at the superior T12 endplate  that appeared to have been present on 11/19/2014. No definite new loss  of vertebral body height. Multiple presumed Schmorl's node deformities  throughout the lumbar spine. Marked STIR hyperintense endplate edema  (Modic type I changes) at L5-S1. Edema also extends into the left L5  pedicle. Mild STIR hyperintense endplate edema at the other levels.    Level by level as follows:     T12-L1: Marked loss of disc height and signal. Circumferential disc  bulge. Normal facets. No significant spinal canal narrowing. No  significant right neural foraminal narrowing. No significant left  neural foraminal narrowing.     L1-L2: Moderate loss of disc height and signal more pronounced towards  the right. Circumferential disc bulge with mild endplate osteophytic  spurring. Normal facets. Mild spinal canal narrowing. Mild right  neural foraminal narrowing. No left neural foraminal narrowing.     L2-L3: Marked loss of disc height and signal with degenerative  endplate changes more pronounced towards the right.  Circumferential  disc bulge with endplate osteophytic spurring more pronounced towards  the right foraminal region. Facet tropism with mild facet hypertrophy.  Mild spinal canal narrowing. Moderate to severe right neural foraminal  narrowing. No significant left neural foraminal narrowing.     L3-L4: Marked loss of disc height and signal. Circumferential disc  bulge with endplate osteophytic spurring. Marked facet tropism with  facet hypertrophy. Moderate spinal canal narrowing. Moderate right  neural foraminal narrowing. Moderate left neural foraminal narrowing.     L4-L5: Grade 1 anterolisthesis with uncovering of the disc. Marked  loss of disc height and signal. Posterior disc bulge with endplate  osteophytic spurring. Marked left greater than right facet hypertrophy  with facet tropism. Mild spinal canal narrowing. Narrowing of the left  lateral recess. Severe right neural foraminal narrowing. Severe left  neural foraminal narrowing.     L5-S1: Marked loss of disc height and signal more pronounced towards  the left. Circumferential disc bulge with endplate osteophytic  spurring more pronounced towards the left foraminal region. Marked  left greater than right facet hypertrophy. Mild spinal canal narrowing  with narrowing of the left lateral recess which could affect the S1  nerve root. No significant right neural foraminal narrowing. Severe  left neural foraminal narrowing.    Paraspinous soft tissues: Unremarkable.       Impression    IMPRESSION:    1. Marked multilevel degenerative changes throughout the lumbar spine  as detailed above. Convex left curvature of the lumbar spine centered  at L2.  2. Moderate spinal canal narrowing at L3-L4. Mild spinal canal  narrowing at L1-L2, L2-L3, L4-L5, and L5-S1.  3. Multiple levels of neural foraminal narrowing. Foraminal narrowings  are most pronounced on the right at L2-L3, bilaterally at L3-L4 and  L4-L5, and on the left at L5-S1.  4. Overall, degenerative findings  are fairly similar to prior MR  11/19/2014.    EUGENE BOWERS MD         SYSTEM ID:  [id number]         If you have any questions or concerns, please call the clinic at the number listed above.       Sincerely,      Dora Fierro MD

## 2022-07-01 ENCOUNTER — ANCILLARY PROCEDURE (OUTPATIENT)
Dept: BONE DENSITY | Facility: CLINIC | Age: 72
End: 2022-07-01
Attending: INTERNAL MEDICINE
Payer: COMMERCIAL

## 2022-07-01 DIAGNOSIS — Z78.0 ASYMPTOMATIC MENOPAUSAL STATE: ICD-10-CM

## 2022-07-01 PROCEDURE — 77080 DXA BONE DENSITY AXIAL: CPT | Performed by: INTERNAL MEDICINE

## 2022-07-01 NOTE — LETTER
2022    Holly Reina  [address]   [facility] [address]          Dear ,    We are writing to inform you of your test results.    Your bone scan shows osteoporosis, weak bones. Follow-up with your provider to discuss your treatment plan.       Resulted Orders   DX Hip/Pelvis/Spine    Narrative    BONE DENSITOMETRY  [address]  [place] [address]  2022      PATIENT: Holly Reina  CHART: [id number]   :  1950  AGE:  71 year old  SEX:  female   REFERRING PROVIDER:  Marcin Galloway MD     PROCEDURE:  Bone density scanning was performed using DXA technology of   the lumbar spine and hip.  Scanning was performed on a Lunar Prodigy   scanner.  Reporting is completed in the form of a T-score.  The T-score   represents the standard deviation from peak bone mass based on a young   healthy adult.     REFERENCE T-SCORES:       Normal                -1.0 and greater                                 Osteopenia         Between -1.0 and -2.5                                             Osteoporosis     -2.5 and less                                       RISK FACTORS:  Post-menopausal, Parent history of osteoporosis, Fracture   of wrist, follow up osteopenia    CURRENT TREATMENT:  None listed     FINDINGS:               Lumbar Spine (L1-L2)      T-score:  -1.3, marked degenerative   changes present at L3,4, so only L1,2 are evaluated.                Left Femoral Neck            T-score:  -2.8               Right Femoral Neck          T-score:  -3.0                           Lumbar (L1-L2) BMD: 1.014  Previous: 0.977                         Total Hip Mean BMD: 0.756  Previous: 0.790     Comparison is made to another DXA performed on the same Lunar Prodigy    machine on 2014.      IMPRESSION  Osteoporosis., Degenerative changes of the lumbar spine which may falsely   elevate results.    There has been a trend toward  significant increase in  "bone density of the   lumbar spine. There has been a trend toward  significant decrease in bone   density of the hip(s).    Recommendations include ensuring adequate Calcium and Vitamin D.    The current NOF Guidelines recommend treatment for patients with prior hip   or vertebral fracture, T-score -2.5 or below, or 10 year risk of any major   osteoporotic fracture 20% or greater, or 10 year risk of hip fracture 3%   or greater as calculated using the FRAX calculator (www.shef.ac.uk/FRAX or   you can google \"FRAX\").      Based on these guidelines, treatment (in addition to calcium and vitamin   D) is recommended for this patient, after ruling out other causes of   osteoporosis.  This is meant as an aid to clinical decision making; one must still use   clinical judgement.    Follow up can be considered in 2 years.   ___________________  Tiny Kevin M.D.  Electronically signed          If you have any questions or concerns, please call the clinic at the number listed above.       Sincerely,      Suyapa Raza MD           "

## 2022-07-01 NOTE — RESULT ENCOUNTER NOTE
Mail letter:  Your bone scan shows osteoporosis, weak bones. Follow-up with your provider to discuss your treatment plan.     Suyapa Raza MD

## 2022-07-01 NOTE — RESULT ENCOUNTER NOTE
Holly SHELTON Super    Here is the MRI report:  This does not show us what we did not already know.  This will help the pain team make recommendations.       Sincerely,       SANTIAGO MORLEY M.D.

## 2022-07-11 ENCOUNTER — ALLIED HEALTH/NURSE VISIT (OUTPATIENT)
Dept: FAMILY MEDICINE | Facility: CLINIC | Age: 72
End: 2022-07-11
Payer: COMMERCIAL

## 2022-07-11 VITALS — WEIGHT: 83.5 LBS | DIASTOLIC BLOOD PRESSURE: 78 MMHG | BODY MASS INDEX: 16.31 KG/M2 | SYSTOLIC BLOOD PRESSURE: 140 MMHG

## 2022-07-11 DIAGNOSIS — Z01.30 BP CHECK: Primary | ICD-10-CM

## 2022-07-11 PROCEDURE — 99207 PR NO CHARGE NURSE ONLY: CPT

## 2022-07-11 NOTE — PROGRESS NOTES
I met with Holly Reina at the request of Dr. Fierro to recheck her blood pressure.  Blood pressure medications on the med list were reviewed with patient.    Patient has taken all medications as per usual regimen: No - pt has been taking quarter tablet of losartan-hydrochlorothiazide 100-25 MG daily, will start taking half tablet tomorrow.    Patient reports tolerating them without any issues or concerns: Yes     Vitals:    07/11/22 1159 07/11/22 1210   BP: (!) 160/94 (!) 140/78   Patient Position: Sitting Sitting   Cuff Size: Child Child     After 5 minutes, the patient's blood pressure remained greater than or equal to 140/90.    Is the patient currently having any chest pain? No  Does the patient currently have a headache? No  Does the patient currently have any vision changes? No  Does the patient currently have any nausea? No  Does the patient currently have any abdominal pain? No    The previous encounter was reviewed.  The patient was discharged and the note will be sent to the provider for final review.   Liv Menezes MA on 7/11/2022 at 12:11 PM

## 2022-07-15 PROCEDURE — 82274 ASSAY TEST FOR BLOOD FECAL: CPT | Performed by: INTERNAL MEDICINE

## 2022-07-18 LAB — HEMOCCULT STL QL IA: NEGATIVE

## 2022-08-26 ENCOUNTER — OFFICE VISIT (OUTPATIENT)
Dept: INTERNAL MEDICINE | Facility: CLINIC | Age: 72
End: 2022-08-26
Payer: COMMERCIAL

## 2022-08-26 VITALS
SYSTOLIC BLOOD PRESSURE: 107 MMHG | WEIGHT: 73 LBS | HEART RATE: 111 BPM | TEMPERATURE: 98 F | BODY MASS INDEX: 14.26 KG/M2 | OXYGEN SATURATION: 99 % | DIASTOLIC BLOOD PRESSURE: 62 MMHG

## 2022-08-26 DIAGNOSIS — R63.6 LOW WEIGHT: ICD-10-CM

## 2022-08-26 DIAGNOSIS — Z01.818 PREOP GENERAL PHYSICAL EXAM: Primary | ICD-10-CM

## 2022-08-26 DIAGNOSIS — F41.9 ANXIETY: ICD-10-CM

## 2022-08-26 DIAGNOSIS — J45.20 MILD INTERMITTENT ASTHMA WITHOUT COMPLICATION: ICD-10-CM

## 2022-08-26 DIAGNOSIS — K62.3 RECTAL PROLAPSE: ICD-10-CM

## 2022-08-26 DIAGNOSIS — Q35.9 CLEFT PALATE: ICD-10-CM

## 2022-08-26 DIAGNOSIS — I10 HYPERTENSION GOAL BP (BLOOD PRESSURE) < 140/90: ICD-10-CM

## 2022-08-26 DIAGNOSIS — M48.061 SPINAL STENOSIS OF LUMBAR REGION, UNSPECIFIED WHETHER NEUROGENIC CLAUDICATION PRESENT: ICD-10-CM

## 2022-08-26 PROBLEM — Z79.899 CONTROLLED SUBSTANCE AGREEMENT SIGNED: Status: ACTIVE | Noted: 2018-08-28

## 2022-08-26 PROCEDURE — 99214 OFFICE O/P EST MOD 30 MIN: CPT | Performed by: INTERNAL MEDICINE

## 2022-08-26 PROCEDURE — 93000 ELECTROCARDIOGRAM COMPLETE: CPT | Performed by: INTERNAL MEDICINE

## 2022-08-26 NOTE — PROGRESS NOTES
[facility]  [address] [place] [address]  [place] [address]  Phone: [phone]  Primary Provider: Dora Fierro  Pre-op Performing Provider: DORA FIERRO       PREOPERATIVE EVALUATION:  Today's date: 8/26/2022    Holly Reina is a 71 year old female who presents for a preoperative evaluation.    70 y/o F here for preop/rectal prolapse.  no surgery date yet.   Seen in ED 8/11/22 with constipation and prolapse sx, Abd CT showed edematous rectum, w/o active prolapse at the time of imagin.  Seen by CRS a couple days later, now she is on OR schedule for a month from now.        She is currently prolapsed again, unable to reduce.   Patient in a lot of pain.          Surgical Information:  Surgery/Procedure: Abdominal Rectopexy  Surgery Location: [facility]  Surgeon: Dr. Sullivan  Surgery Date: 9/29/22  Time of Surgery:   Where patient plans to recover: At home alone  Fax number for surgical facility: [phone]    Type of Anesthesia Anticipated: to be determined    Assessment & Plan     The proposed surgical procedure is considered INTERMEDIATE risk.    Preop general physical exam  She is okay for surgery   - EKG 12-lead complete w/read - Clinics    Rectal prolapse   needs solution asap due to recurrence   Currently in state of prolapse, very uncomfortable and distressed  She will go to ER for help, we were unable to reduce her.   Consider if CRS can perform surgery on this sooner (if able)  Preop is done...     Hypertension goal BP (blood pressure) < 140/90   lower BP today  She has lost 10# this past month.  Concerning.      Spinal stenosis of lumbar region, unspecified whether neurogenic claudication present   limits mobility     Low weight  BMI is now at 16.       Mild intermittent asthma without complication   controlled.     Cleft palate   note to anesthesia.      Anxiety              Risks and Recommendations:  The patient has the following additional risks and recommendations for  perioperative complications:   - Consult Hospitalist / IM to assist with post-op medical management   -  Anxiety is severe, needs a lot of reassurance  - she is losing a lot of weight   - she has Cleft palate  - she is [place]    Medication Instructions:  Patient is to take all scheduled medications on the day of surgery    RECOMMENDATION:  APPROVAL GIVEN to proceed with proposed procedure, without further diagnostic evaluation.       Today she has a recurrence and is in a lot of pain/distress  I am unable to reduce.   I am concerned about her weight loss, and wonder if procedure could happen sooner as I suspect she is becoming quite underweight due the anxiety and apprehension this is causing.   SHe will [facility] to [facility]  I will complete this preop        I spent a total of 30 minutes on the day of the visit.   Time spent doing chart review, history and exam, documentation and further activities per the note         Subjective     HPI related to upcoming procedure: recurrent rectal prolapse ,  Presenting to ER every few days for reduction.     Preop Questions 8/26/2022   1. Have you ever had a heart attack or stroke? No   2. Have you ever had surgery on your heart or blood vessels, such as a stent placement, a coronary artery bypass, or surgery on an artery in your head, neck, heart, or legs? No   3. Do you have chest pain with activity? No   4. Do you have a history of  heart failure? No   5. Do you currently have a cold, bronchitis or symptoms of other infection? No   6. Do you have a cough, shortness of breath, or wheezing? NO    7. Do you or anyone in your family have previous history of blood clots? No   8. Do you or does anyone in your family have a serious bleeding problem such as prolonged bleeding following surgeries or cuts? No   9. Have you ever had problems with anemia or been told to take iron pills? YES - remote   10. Have you had any abnormal blood loss such as black, tarry or bloody stools, or abnormal  vaginal bleeding? UNKNOWN -    11. Have you ever had a blood transfusion? YES -    11a. Have you ever had a transfusion reaction? No   12. Are you willing to have a blood transfusion if it is medically needed before, during, or after your surgery? Yes   13. Have you or any of your relatives ever had problems with anesthesia? No   14. Do you have sleep apnea, excessive snoring or daytime drowsiness? No   15. Do you have any artifical heart valves or other implanted medical devices like a pacemaker, defibrillator, or continuous glucose monitor? No   16. Do you have artificial joints? No   17. Are you allergic to latex? YES:         Health Care Directive:  Patient has a Health Care Directive on file      Preoperative Review of :   reviewed - controlled substances reflected in medication list.       Status of Chronic Conditions:  ASTHMA - Patient has a longstanding history of mild Asthma . Patient has been doing well overall noting NO SYMPTOMS and continues on medication regimen consisting of prn albuterol  without adverse reactions or side effects.     ANXIETY  DEPRESSION - Patient has a long history of severe anxiety  requiring medication for control with recent symptoms being waxing and waning..Current symptoms of depression include psychomotor agitation (restless and /or feeling on edge), anxiety, panic attacks.     HYPERTENSION - Patient has longstanding history of HTN , currently denies any symptoms referable to elevated blood pressure. Specifically denies chest pain, palpitations, dyspnea, orthopnea, PND or peripheral edema. Blood pressure readings have been in normal range. Current medication regimen is as listed below. Patient denies any side effects of medication.   A couple months ago she was very hypertensive, did not remove any meds today for fear of recurrent hypertensive urgency        Review of Systems  CONSTITUTIONAL: NEGATIVE for fever, chills, losing weight due to anxiety / apprehension around  recurrent prolapse    ENT/MOUTH: NEGATIVE for ear, mouth and throat problems  RESP: NEGATIVE for significant cough or SOB  CV: NEGATIVE for chest pain, palpitations or peripheral edema  GI: pain at rectal prolapse site.    PSYCHIATRIC: NEGATIVE for changes in mood or affect, anxiety and appetite disturbance      Patient Active Problem List    Diagnosis Date Noted     Left renal stone 03/26/2020     Priority: Medium     Incidental finding of 5 mm non obstructing L renal pelvis stone during abd pain work up       Cleft palate 10/15/2019     Priority: Medium     Controlled substance agreement signed 08/28/2018     Priority: Medium     Patient is followed by SANTIAGO MORLEY for ongoing prescription of benzodiazepines.  All refills should be approved by this provider, or covering partner.    Medication(s): xanax.   Maximum quantity per month: 60  (gets 180 at a time for a 3 month supply)  Clinic visit frequency required: Q 6  months     Controlled substance agreement on file: Yes       Date(s): 8/28/18  Benzodiazepine use reviewed by psychiatry:  No    Last [facility] website verification:  done on 8/28/18   [email or web address]           Mild intermittent asthma without complication 03/08/2016     Priority: Medium     Fracture of distal end of radius and ulna, right, closed, with routine healing, subsequent encounter 12/22/2015     Priority: Medium     Gastroesophageal reflux disease without esophagitis 09/15/2015     Priority: Medium     Osteoporosis 06/16/2015     Priority: Medium     Anxiety 03/13/2012     Priority: Medium     Scoliosis deformity of spine 09/07/2011     Priority: Medium     Spinal stenosis 09/07/2011     Priority: Medium     Advanced directives, counseling/discussion 07/25/2011     Priority: Medium     Advance Directive Problem List Overview:   Name Relationship Phone    Primary Health Care Agent            Alternative Health Care Agent          Discussed advance care planning with patient;  information given to patient to review. 7/25/2011          ASCUS on Pap smear 07/06/2011     Priority: Medium     7/8/10 pap ASCUS neg HPV.  Per protocol, repeat screening pap in 1 year (7/2011)  4/11/12 pap NIL. Plan-- routine screening       Hypertension goal BP (blood pressure) < 140/90      Priority: Medium     CARDIOVASCULAR SCREENING; LDL GOAL LESS THAN 130 10/31/2010     Priority: Medium      Past Medical History:   Diagnosis Date     Anxiety      Cleft palate     infections     GERD (gastroesophageal reflux disease)      Hypertension goal BP (blood pressure) < 140/90      Osteoporosis      RBBB (right bundle branch block)      SBO (small bowel obstruction) (H) 09/2003     Spinal stenosis      Stress incontinence      Weight loss     unclear etiology.  workup negative     Past Surgical History:   Procedure Laterality Date     DECOMPRESSION LUMBAR ONE LEVEL  2002    l4/5  -- Dr Mckinley     HC DILATION/CURETTAGE DIAG/THER NON OB       HYSTERECTOMY, CERVIX STATUS UNKNOWN       ZZC JACOB NOSE/CLEFT LIP/TIP  childhood     Current Outpatient Medications   Medication Sig Dispense Refill     albuterol 90 MCG/ACT inhaler Inhale 1-2 puffs into the lungs every 6 hours as needed for shortness of breath / dyspnea. 1 Inhaler 4     ALPRAZolam (XANAX) 0.25 MG tablet Take 1 tablet (0.25 mg) by mouth 2 times daily as needed for anxiety 60 tablet 1     amLODIPine (NORVASC) 5 MG tablet TAKE 1 TABLET(5 MG) BY MOUTH TWICE DAILY 180 tablet 1     atenolol (TENORMIN) 50 MG tablet TAKE 1 TABLET BY MOUTH TWICE A WEEK 180 tablet 3     cloNIDine (CATAPRES) 0.1 MG tablet TAKE 1 TABLET BY MOUTH THREE TIMES DAILY 270 tablet 1     fluticasone (FLONASE) 50 MCG/ACT nasal spray Spray 1 spray into both nostrils daily       losartan-hydrochlorothiazide (HYZAAR) 100-25 MG tablet Take 1 tablet by mouth daily 90 tablet 3     Multiple Vitamins-Minerals (ONE-A-DAY WOMENS 50 PLUS PO) Take  by mouth daily. (Patient not taking: No sig reported)        "sertraline (ZOLOFT) 25 MG tablet TAKE 1 TABLET(25 MG) BY MOUTH EVERY DAY 90 tablet 0     STATIN NOT PRESCRIBED, INTENTIONAL, Please choose reason not prescribed, below       vitamin B complex with vitamin C (STRESS TAB) TABS tablet Take 1 tablet by mouth daily (Patient not taking: No sig reported)         Allergies   Allergen Reactions     Penicillins Rash and Shortness Of Breath     Citalopram Other (See Comments)     Dry mouth, pressures in chest  And nose congestion. Patient also had light headed and nausea     Other [No Clinical Screening - See Comments]      \"I'm allergic to Laughing Gas at the Dentist\"     Pravastatin      Aches.      Quinapril      Possible  cough     Triamterene-Hctz [Hydrochlorothiazide W/Triamterene]         Social History     Tobacco Use     Smoking status: Former Smoker     Packs/day: 0.00     Quit date: 1960     Years since quittin.8     Smokeless tobacco: Never Used   Substance Use Topics     Alcohol use: Yes     Comment: seldom     Family History   Problem Relation Age of Onset     Heart Disease Mother      Hypertension Mother      Heart Disease Father      Hypertension Father      History   Drug Use No         Objective     There were no vitals taken for this visit.    Physical Exam  GENERAL APPEARANCE: alert, no distress and moderate distress  HENT: ear canals and TM's  Cleft palate, has upper plate  RESP: lungs clear to auscultation - no rales, rhonchi or wheezes  CV: regular rate and rhythm, normal S1 S2, no S3 or S4 and no murmur, click or rub   ABDOMEN: soft, nontender, no HSM or masses and bowel sounds normal  ABDOMEN: scaphoid  Rectum:  Very large mass of prolapsed tissue/mucosa,  Unable to reduce.  Patient in a lot of pain.    NEURO: Normal strength and tone, sensory exam grossly normal, mentation intact and speech normal    Recent Labs   Lab Test 06/10/22  1056   HGB 11.2*         POTASSIUM 3.7   CR 0.57        Diagnostics:  No labs were ordered during " this visit.   See 8/10/22 labs done in ER   EKG: appears normal, NSR, normal axis, normal intervals, no acute ST/T changes c/w ischemia, no LVH by voltage criteria, unchanged from previous tracings   No change from 2019    Revised Cardiac Risk Index (RCRI):  The patient has the following serious cardiovascular risks for perioperative complications:   - No serious cardiac risks = 0 points     RCRI Interpretation: 0 points: Class I (very low risk - 0.4% complication rate)           Signed Electronically by: Dora Fierro MD  Copy of this evaluation report is provided to requesting physician.

## 2022-08-26 NOTE — PATIENT INSTRUCTIONS
Go to [facility] for help with reduction.     For surgery: okay to take all of your usual morning medications.

## 2022-09-21 ENCOUNTER — TELEPHONE (OUTPATIENT)
Dept: INTERNAL MEDICINE | Facility: CLINIC | Age: 72
End: 2022-09-21

## 2022-09-21 NOTE — TELEPHONE ENCOUNTER
Reason for call:  Other   Patient called regarding (reason for call): appointment  Additional comments: Patient is scheduled at [facility] for surgery on 9/29/2022    She was instructed to have a Pre-op with her doctor, a COVID test and Immunization.    There are no openings with the provider or with in the clinic at this time. Please contact patient so she can get scheduled.     Phone number to reach patient:  Cell number on file:    Telephone Information:   Mobile [phone]     Best Time:      Can we leave a detailed message on this number?  YES    Travel screening: Not Applicable

## 2022-09-22 ENCOUNTER — OFFICE VISIT (OUTPATIENT)
Dept: INTERNAL MEDICINE | Facility: CLINIC | Age: 72
End: 2022-09-22
Payer: COMMERCIAL

## 2022-09-22 VITALS
TEMPERATURE: 97.9 F | SYSTOLIC BLOOD PRESSURE: 124 MMHG | HEIGHT: 60 IN | DIASTOLIC BLOOD PRESSURE: 58 MMHG | WEIGHT: 75 LBS | RESPIRATION RATE: 12 BRPM | OXYGEN SATURATION: 99 % | HEART RATE: 95 BPM | BODY MASS INDEX: 14.72 KG/M2

## 2022-09-22 DIAGNOSIS — D64.9 NORMOCYTIC NORMOCHROMIC ANEMIA: ICD-10-CM

## 2022-09-22 DIAGNOSIS — Z01.818 PREOP GENERAL PHYSICAL EXAM: Primary | ICD-10-CM

## 2022-09-22 DIAGNOSIS — M41.35 THORACOGENIC SCOLIOSIS OF THORACOLUMBAR REGION: ICD-10-CM

## 2022-09-22 DIAGNOSIS — R63.6 LOW WEIGHT: ICD-10-CM

## 2022-09-22 DIAGNOSIS — Q35.9 CLEFT PALATE: ICD-10-CM

## 2022-09-22 DIAGNOSIS — F41.9 ANXIETY: ICD-10-CM

## 2022-09-22 DIAGNOSIS — M48.061 SPINAL STENOSIS OF LUMBAR REGION, UNSPECIFIED WHETHER NEUROGENIC CLAUDICATION PRESENT: ICD-10-CM

## 2022-09-22 DIAGNOSIS — R00.0 SINUS TACHYCARDIA: ICD-10-CM

## 2022-09-22 DIAGNOSIS — Z23 NEEDS FLU SHOT: ICD-10-CM

## 2022-09-22 DIAGNOSIS — Z23 NEED FOR PROPHYLACTIC VACCINATION AND INOCULATION AGAINST INFLUENZA: ICD-10-CM

## 2022-09-22 DIAGNOSIS — I10 HYPERTENSION GOAL BP (BLOOD PRESSURE) < 140/90: ICD-10-CM

## 2022-09-22 DIAGNOSIS — K62.3 RECTAL PROLAPSE: ICD-10-CM

## 2022-09-22 DIAGNOSIS — J45.20 MILD INTERMITTENT ASTHMA WITHOUT COMPLICATION: ICD-10-CM

## 2022-09-22 LAB
ERYTHROCYTE [DISTWIDTH] IN BLOOD BY AUTOMATED COUNT: 15.6 % (ref 10–15)
HCT VFR BLD AUTO: 34.4 % (ref 35–47)
HGB BLD-MCNC: 11.2 G/DL (ref 11.7–15.7)
MCH RBC QN AUTO: 29.2 PG (ref 26.5–33)
MCHC RBC AUTO-ENTMCNC: 32.6 G/DL (ref 31.5–36.5)
MCV RBC AUTO: 90 FL (ref 78–100)
PLATELET # BLD AUTO: 219 10E3/UL (ref 150–450)
RBC # BLD AUTO: 3.83 10E6/UL (ref 3.8–5.2)
WBC # BLD AUTO: 5 10E3/UL (ref 4–11)

## 2022-09-22 PROCEDURE — U0005 INFEC AGEN DETEC AMPLI PROBE: HCPCS | Performed by: INTERNAL MEDICINE

## 2022-09-22 PROCEDURE — 90686 IIV4 VACC NO PRSV 0.5 ML IM: CPT | Performed by: INTERNAL MEDICINE

## 2022-09-22 PROCEDURE — 36416 COLLJ CAPILLARY BLOOD SPEC: CPT | Performed by: INTERNAL MEDICINE

## 2022-09-22 PROCEDURE — 36415 COLL VENOUS BLD VENIPUNCTURE: CPT | Performed by: INTERNAL MEDICINE

## 2022-09-22 PROCEDURE — G0008 ADMIN INFLUENZA VIRUS VAC: HCPCS | Performed by: INTERNAL MEDICINE

## 2022-09-22 PROCEDURE — U0003 INFECTIOUS AGENT DETECTION BY NUCLEIC ACID (DNA OR RNA); SEVERE ACUTE RESPIRATORY SYNDROME CORONAVIRUS 2 (SARS-COV-2) (CORONAVIRUS DISEASE [COVID-19]), AMPLIFIED PROBE TECHNIQUE, MAKING USE OF HIGH THROUGHPUT TECHNOLOGIES AS DESCRIBED BY CMS-2020-01-R: HCPCS | Performed by: INTERNAL MEDICINE

## 2022-09-22 PROCEDURE — 80048 BASIC METABOLIC PNL TOTAL CA: CPT | Performed by: INTERNAL MEDICINE

## 2022-09-22 PROCEDURE — 85027 COMPLETE CBC AUTOMATED: CPT | Performed by: INTERNAL MEDICINE

## 2022-09-22 PROCEDURE — 99214 OFFICE O/P EST MOD 30 MIN: CPT | Mod: 25 | Performed by: INTERNAL MEDICINE

## 2022-09-22 ASSESSMENT — ASTHMA QUESTIONNAIRES: ACT_TOTALSCORE: 20

## 2022-09-22 NOTE — LETTER
My Asthma Action Plan    Name: Holly Reina   YOB: 1950  Date: 9/22/2022   My doctor: Dora Fierro MD   My clinic: [facility]        My Rescue Medicine:   Albuterol inhaler (Proair/Ventolin/Proventil HFA)  2-4 puffs EVERY 4 HOURS as needed. Use a spacer if recommended by your provider.   My Asthma Severity:   Intermittent / Exercise Induced  Know your asthma triggers: smoke and upper respiratory infections             GREEN ZONE   Good Control    I feel good    No cough or wheeze    Can work, sleep and play without asthma symptoms       Take your asthma control medicine every day.     1. If exercise triggers your asthma, take your rescue medication    15 minutes before exercise or sports, and    During exercise if you have asthma symptoms  2. Spacer to use with inhaler: If you have a spacer, make sure to use it with your inhaler             YELLOW ZONE Getting Worse  I have ANY of these:    I do not feel good    Cough or wheeze    Chest feels tight    Wake up at night   1. Keep taking your Green Zone medications  2. Start taking your rescue medicine:    every 20 minutes for up to 1 hour. Then every 4 hours for 24-48 hours.  3. If you stay in the Yellow Zone for more than 12-24 hours, contact your doctor.  4. If you do not return to the Green Zone in 12-24 hours or you get worse, start taking your oral steroid medicine if prescribed by your provider.           RED ZONE Medical Alert - Get Help  I have ANY of these:    I feel awful    Medicine is not helping    Breathing getting harder    Trouble walking or talking    Nose opens wide to breathe       1. Take your rescue medicine NOW  2. If your provider has prescribed an oral steroid medicine, start taking it NOW  3. Call your doctor NOW  4. If you are still in the Red Zone after 20 minutes and you have not reached your doctor:    Take your rescue medicine again and    Call 911 or go to the emergency room right  away    See your regular doctor within 2 weeks of an Emergency Room or Urgent Care visit for follow-up treatment.          Annual Reminders:  Meet with Asthma Educator,  Flu Shot in the Fall, consider Pneumonia Vaccination for patients with asthma (aged 19 and older).    Pharmacy:    [id number] - [place], [place] - [address]  [id number] [place], [place] - [address] AT [facility] & [id number] - [place], [place] - [address] AT [facility] [address] [facility] [address]    Electronically signed by Dora Fierro MD   Date: 09/22/22                    Asthma Triggers  How To Control Things That Make Your Asthma Worse    Triggers are things that make your asthma worse.  Look at the list below to help you find your triggers and   what you can do about them. You can help prevent asthma flare-ups by staying away from your triggers.      Trigger                                                          What you can do   Cigarette Smoke  Tobacco smoke can make asthma worse. Do not allow smoking in your home, car or around you.  Be sure no one smokes at a child s day care or school.  If you smoke, ask your health care provider for ways to help you quit.  Ask family members to quit too.  Ask your health care provider for a referral to Quit Plan to help you quit smoking, or call 1-508-961-PLAN.     Colds, Flu, Bronchitis  These are common triggers of asthma. Wash your hands often.  Don t touch your eyes, nose or mouth.  Get a flu shot every year.     Dust Mites  These are tiny bugs that live in cloth or carpet. They are too small to see. Wash sheets and blankets in hot water every week.   Encase pillows and mattress in dust mite proof covers.  Avoid having carpet if you can. If you have carpet, vacuum weekly.   Use a dust mask and HEPA vacuum.   Pollen and Outdoor Mold  Some people are allergic to trees, grass, or weed pollen, or molds. Try to keep your windows  closed.  Limit time out doors when pollen count is high.   Ask you health care provider about taking medicine during allergy season.     Animal Dander  Some people are allergic to skin flakes, urine or saliva from pets with fur or feathers. Keep pets with fur or feathers out of your home.    If you can t keep the pet outdoors, then keep the pet out of your bedroom.  Keep the bedroom door closed.  Keep pets off cloth furniture and away from stuffed toys.     Mice, Rats, and Cockroaches  Some people are allergic to the waste from these pests.   Cover food and garbage.  Clean up spills and food crumbs.  Store grease in the refrigerator.   Keep food out of the bedroom.   Indoor Mold  This can be a trigger if your home has high moisture. Fix leaking faucets, pipes, or other sources of water.   Clean moldy surfaces.  Dehumidify basement if it is damp and smelly.   Smoke, Strong Odors, and Sprays  These can reduce air quality. Stay away from strong odors and sprays, such as perfume, powder, hair spray, paints, smoke incense, paint, cleaning products, candles and new carpet.   Exercise or Sports  Some people with asthma have this trigger. Be active!  Ask your doctor about taking medicine before sports or exercise to prevent symptoms.    Warm up for 5-10 minutes before and after sports or exercise.     Other Triggers of Asthma  Cold air:  Cover your nose and mouth with a scarf.  Sometimes laughing or crying can be a trigger.  Some medicines and food can trigger asthma.

## 2022-09-22 NOTE — LETTER
September 26, 2022    Holly Reina  [address]   [facility] [address]          Dear ,    We are writing to inform you of your test results.    Covid screen is negative/normal  Blood count stable  Electrolytes and kidney function look great.       Resulted Orders   Asymptomatic COVID-19 Virus (Coronavirus) by PCR Nose   Result Value Ref Range    SARS CoV2 PCR Negative Negative      Comment:      NEGATIVE: SARS-CoV-2 (COVID-19) RNA not detected, presumed negative.    Narrative    Testing was performed using the Aptima SARS-CoV-2 Assay on the  [employer] Instrument System. Additional information about this  Emergency Use Authorization (EUA) assay can be found via the Lab  Guide. This test should be ordered for the detection of SARS-CoV-2 in  individuals who meet SARS-CoV-2 clinical and/or epidemiological  criteria. Test performance is unknown in asymptomatic patients. This  test is for in vitro diagnostic use under the FDA EUA for  laboratories certified under CLIA to perform high complexity testing.  This test has not been FDA cleared or approved. A negative result  does not rule out the presence of PCR inhibitors in the specimen or  target RNA in concentration below the limit of detection for the  assay. The possibility of a false negative should be considered if  the patient's recent exposure or clinical presentation suggests  COVID-19. This test was validated by the [facility]. This laboratory is certified under  the Clinical Laboratory Improvement Amendments of 1988 (CLIA-88) as  qualified to perform high complexity laboratory testing.   CBC with platelets   Result Value Ref Range    WBC Count 5.0 4.0 - 11.0 10e3/uL    RBC Count 3.83 3.80 - 5.20 10e6/uL    Hemoglobin 11.2 (L) 11.7 - 15.7 g/dL    Hematocrit 34.4 (L) 35.0 - 47.0 %    MCV 90 78 - 100 fL    MCH 29.2 26.5 - 33.0 pg    MCHC 32.6 31.5 - 36.5 g/dL    RDW 15.6 (H) 10.0 - 15.0 %     Platelet Count 219 150 - 450 10e3/uL   Basic metabolic panel   Result Value Ref Range    Sodium 137 133 - 144 mmol/L    Potassium 4.1 3.4 - 5.3 mmol/L    Chloride 102 94 - 109 mmol/L    Carbon Dioxide (CO2) 31 20 - 32 mmol/L    Anion Gap 4 3 - 14 mmol/L    Urea Nitrogen 13 7 - 30 mg/dL    Creatinine 0.39 (L) 0.52 - 1.04 mg/dL    Calcium 9.8 8.5 - 10.1 mg/dL    Glucose 94 70 - 99 mg/dL    GFR Estimate >90 >60 mL/min/1.73m2      Comment:      Effective December 21, 2021 eGFRcr in adults is calculated using the 2021 CKD-EPI creatinine equation which includes age and gender (Leticia et al., NEJM, DOI: 10.1056/GMSGhz8751116)       If you have any questions or concerns, please call the clinic at the number listed above.       Sincerely,      Dora Fierro MD

## 2022-09-22 NOTE — PATIENT INSTRUCTIONS
Take all the usual medications the morning of surgery with small sips      A  will call you about getting a covid test done early next week.       Return to clinic after new year for annual physical

## 2022-09-22 NOTE — TELEPHONE ENCOUNTER
Left message informing patient.  Assist with scheduling when call is returned.     Kailee Goss MA

## 2022-09-22 NOTE — PROGRESS NOTES
[facility]  [address]  [place] [address]  Phone: [phone]  Primary Provider: Dora Fierro  Pre-op Performing Provider: DORA FIERRO       PREOPERATIVE EVALUATION:  Today's date: 9/22/2022    Holly Reina is a 71 year old female who presents for a preoperative evaluation.    Surgical Information:  Surgery/Procedure: ABDOMINAL RECTOPEXY  Surgery Location: [facility]   Surgeon: Penny Sullivan MD  Surgery Date: 9/29/2022  Time of Surgery: 9:18 AM  Where patient plans to recover: At home with family   Fax number for surgical facility:  [phone]        Type of Anesthesia Anticipated: to be determined    Assessment & Plan     The proposed surgical procedure is considered LOW risk.    Preop general physical exam  Okay for surgery   Will arrange COVID test.   Rectal prolapse       Hypertension goal BP (blood pressure) < 140/90  Continue current management      - Basic metabolic panel; Future    Mild intermittent asthma without complication   prn albuterol.  Has not been an issue.   - Asthma Action Plan (AAP); Future    Low weight  Even lower now .   Hard to eat due to  Prolapse anticipation.     Anxiety   prn xanax needed  She responds well to reassurance/redirection.     Spinal stenosis of lumbar region, unspecified whether neurogenic claudication present   good mobility now.     Cleft palate   affects speech and hearing     Thoracogenic scoliosis of thoracolumbar region   may affect position during surgery     Normocytic normochromic anemia   likely poor intake.   - CBC with platelets; Future    Sinus tachycardia   due to anxiety  She and I have long term relationship: Pulse recheck after a couple minutes in in upper normal range.  Sinus.            Risks and Recommendations:  The patient has the following additional risks and recommendations for perioperative complications:   -  - Consult Hospitalist / IM to assist with post-op medical  management   -  Anxiety is severe, needs a lot of reassurance.   She might take a xanax on way in.   - she is losing a lot of weight   - she has Cleft palate: note for intubation.   - she is Tribal    Medication Instructions:  Patient is to take all scheduled medications on the day of surgery    RECOMMENDATION:  APPROVAL GIVEN to proceed with proposed procedure, without further diagnostic evaluation.       I spent a total of 30 minutes on the day of the visit.   Time spent doing chart review, history and exam, documentation and further activities per the note         Subjective     HPI related to upcoming procedure:    H/o  cleft palate, anxiety, HTN, scoliosis, spinal stenosis, osteoporosis (not taking Fosamax), mild intermittent asthma, Tribal    She has been having issues with rectal prolapse and will have this surgically intervened.      Preop Questions 9/22/2022   1. Have you ever had a heart attack or stroke? No   2. Have you ever had surgery on your heart or blood vessels, such as a stent placement, a coronary artery bypass, or surgery on an artery in your head, neck, heart, or legs? No   3. Do you have chest pain with activity? No   4. Do you have a history of  heart failure? No   5. Do you currently have a cold, bronchitis or symptoms of other infection? No    6. Do you have a cough, shortness of breath, or wheezing? No   7. Do you or anyone in your family have previous history of blood clots? No   8. Do you or does anyone in your family have a serious bleeding problem such as prolonged bleeding following surgeries or cuts? No   9. Have you ever had problems with anemia or been told to take iron pills? YES -    10. Have you had any abnormal blood loss such as black, tarry or bloody stools, or abnormal vaginal bleeding? No   11. Have you ever had a blood transfusion? YES -    11a. Have you ever had a transfusion reaction? No   12. Are you willing to have a blood transfusion if it is medically needed before,  during, or after your surgery? Yes   13. Have you or any of your relatives ever had problems with anesthesia? No   14. Do you have sleep apnea, excessive snoring or daytime drowsiness? No   15. Do you have any artifical heart valves or other implanted medical devices like a pacemaker, defibrillator, or continuous glucose monitor? No   16. Do you have artificial joints? No   17. Are you allergic to latex? No     Health Care Directive:  Patient has a Health Care Directive on file      Preoperative Review of :   reviewed - controlled substances reflected in medication list.   b    Status of Chronic Conditions:  ASTHMA - Patient has a longstanding history of moderate-severe Asthma . Patient has been doing well overall noting NO SYMPTOMS and continues on medication regimen consisting of prn albuterol without adverse reactions or side effects.     HYPERTENSION - Patient has longstanding history of HTN , currently denies any symptoms referable to elevated blood pressure. Specifically denies chest pain, palpitations, dyspnea, orthopnea, PND or peripheral edema. Blood pressure readings have been in normal range. Current medication regimen is as listed below. Patient denies any side effects of medication.       Review of Systems  CONSTITUTIONAL: NEGATIVE for fever, chills, change in weight  ENT/MOUTH: NEGATIVE for ear, mouth and throat problems  RESP: NEGATIVE for significant cough or SOB  CV: NEGATIVE for chest pain, palpitations or peripheral edema  GI: NEGATIVE for nausea, abdominal pain, heartburn, or change in bowel habits   Recurrent prolapse  MUSCULOSKELETAL: chronic back pain/spinal stenosis.  manging   PSYCHIATRIC: NEGATIVE for changes in mood or affect and at baseline very anxious, can be reassured.      Patient Active Problem List    Diagnosis Date Noted     Left renal stone 03/26/2020     Priority: Medium     Incidental finding of 5 mm non obstructing L renal pelvis stone during abd pain work up       Cleft  palate 10/15/2019     Priority: Medium     Controlled substance agreement signed 08/28/2018     Priority: Medium     Patient is followed by SANTIAGO MORLEY for ongoing prescription of benzodiazepines.  All refills should be approved by this provider, or covering partner.    Medication(s): xanax.   Maximum quantity per month: 60  (gets 180 at a time for a 3 month supply)  Clinic visit frequency required: Q 6  months     Controlled substance agreement on file: Yes       Date(s): 8/28/18  Benzodiazepine use reviewed by psychiatry:  No    Last [facility] website verification:  done on 8/28/18   [email or web address]    Update 8/25/22:  RARE USE now...        Mild intermittent asthma without complication 03/08/2016     Priority: Medium     Fracture of distal end of radius and ulna, right, closed, with routine healing, subsequent encounter 12/22/2015     Priority: Medium     Gastroesophageal reflux disease without esophagitis 09/15/2015     Priority: Medium     Osteoporosis 06/16/2015     Priority: Medium     Anxiety 03/13/2012     Priority: Medium     Scoliosis deformity of spine 09/07/2011     Priority: Medium     Spinal stenosis 09/07/2011     Priority: Medium     Advanced directives, counseling/discussion 07/25/2011     Priority: Medium     Advance Directive Problem List Overview:   Name Relationship Phone    Primary Health Care Agent            Alternative Health Care Agent          Discussed advance care planning with patient; information given to patient to review. 7/25/2011          ASCUS on Pap smear 07/06/2011     Priority: Medium     7/8/10 pap ASCUS neg HPV.  Per protocol, repeat screening pap in 1 year (7/2011)  4/11/12 pap NIL. Plan-- routine screening       Hypertension goal BP (blood pressure) < 140/90      Priority: Medium     CARDIOVASCULAR SCREENING; LDL GOAL LESS THAN 130 10/31/2010     Priority: Medium      Past Medical History:   Diagnosis Date     Anxiety      Cleft palate     infections      GERD (gastroesophageal reflux disease)      Hypertension goal BP (blood pressure) < 140/90      Osteoporosis      RBBB (right bundle branch block)      SBO (small bowel obstruction) (H) 09/2003     Spinal stenosis      Stress incontinence      Weight loss     unclear etiology.  workup negative     Past Surgical History:   Procedure Laterality Date     DECOMPRESSION LUMBAR ONE LEVEL  2002    l4/5  -- Dr Arlen COUCH DILATION/CURETTAGE DIAG/THER NON OB       HYSTERECTOMY, CERVIX STATUS UNKNOWN       ZZC JACOB NOSE/CLEFT LIP/TIP  childhood     Current Outpatient Medications   Medication Sig Dispense Refill     albuterol 90 MCG/ACT inhaler Inhale 1-2 puffs into the lungs every 6 hours as needed for shortness of breath / dyspnea. 1 Inhaler 4     ALPRAZolam (XANAX) 0.25 MG tablet Take 1 tablet (0.25 mg) by mouth 2 times daily as needed for anxiety 60 tablet 1     amLODIPine (NORVASC) 5 MG tablet TAKE 1 TABLET(5 MG) BY MOUTH TWICE DAILY 180 tablet 1     atenolol (TENORMIN) 50 MG tablet TAKE 1 TABLET BY MOUTH TWICE A WEEK 180 tablet 3     cloNIDine (CATAPRES) 0.1 MG tablet TAKE 1 TABLET BY MOUTH THREE TIMES DAILY 270 tablet 1     fluticasone (FLONASE) 50 MCG/ACT nasal spray Spray 1 spray into both nostrils daily       losartan-hydrochlorothiazide (HYZAAR) 100-25 MG tablet Take 1 tablet by mouth daily 90 tablet 3     Multiple Vitamins-Minerals (ONE-A-DAY WOMENS 50 PLUS PO) Take  by mouth daily. (Patient not taking: No sig reported)       sertraline (ZOLOFT) 25 MG tablet TAKE 1 TABLET(25 MG) BY MOUTH EVERY DAY 90 tablet 0     STATIN NOT PRESCRIBED, INTENTIONAL, Please choose reason not prescribed, below       vitamin B complex with vitamin C (STRESS TAB) TABS tablet Take 1 tablet by mouth daily         Allergies   Allergen Reactions     Penicillins Rash and Shortness Of Breath     Citalopram Other (See Comments)     Dry mouth, pressures in chest  And nose congestion. Patient also had light headed and nausea     Other [No  "Clinical Screening - See Comments]      \"I'm allergic to Laughing Gas at the Dentist\"     Pravastatin      Aches.      Quinapril      Possible  cough     Triamterene-Hctz [Hydrochlorothiazide W/Triamterene]         Social History     Tobacco Use     Smoking status: Former Smoker     Packs/day: 0.00     Quit date: 1960     Years since quittin.9     Smokeless tobacco: Never Used   Substance Use Topics     Alcohol use: Yes     Comment: seldom     Family History   Problem Relation Age of Onset     Heart Disease Mother      Hypertension Mother      Heart Disease Father      Hypertension Father      History   Drug Use No         Objective     /58   Pulse 109   Temp 97.9  F (36.6  C) (Tympanic)   Resp 12   Ht 1.524 m (5')   Wt 34 kg (75 lb)   LMP  (LMP Unknown)   SpO2 99%   Breastfeeding No   BMI 14.65 kg/m      Physical Exam  GENERAL APPEARANCE: alert, no distress, cooperative and very underweight (baseline, to a degree...)  HENT: [place].  Micrognathia.  Cleft palate, affects her speech.   RESP: lungs clear to auscultation - no rales, rhonchi or wheezes  CV: regular rate and rhythm, normal S1 S2, no S3 or S4 and no murmur, click or rub   ABDOMEN: soft, nontender, no HSM or masses and bowel sounds normal  MS: extremities normal- no gross deformities noted and moderate Lumbar/thoracic scoliosis.   NEURO: Normal strength and tone, sensory exam grossly normal, mentation intact and speech normal  PSYCH: mentation appears normal, anxious and worried - per baseline.     Recent Labs   Lab Test 06/10/22  1056   HGB 11.2*         POTASSIUM 3.7   CR 0.57        Diagnostics:  Labs pending at this time.  Results will be reviewed when available.   EKG:  similar to 2019, unchanged from previous tracings  NSR, LAFB.     Revised Cardiac Risk Index (RCRI):  The patient has the following serious cardiovascular risks for perioperative complications:   - No serious cardiac risks = 0 points     RCRI " Interpretation: 0 points: Class I (very low risk - 0.4% complication rate)           Signed Electronically by: Dora Fierro MD  Copy of this evaluation report is provided to requesting physician.

## 2022-09-23 LAB
ANION GAP SERPL CALCULATED.3IONS-SCNC: 4 MMOL/L (ref 3–14)
BUN SERPL-MCNC: 13 MG/DL (ref 7–30)
CALCIUM SERPL-MCNC: 9.8 MG/DL (ref 8.5–10.1)
CHLORIDE BLD-SCNC: 102 MMOL/L (ref 94–109)
CO2 SERPL-SCNC: 31 MMOL/L (ref 20–32)
CREAT SERPL-MCNC: 0.39 MG/DL (ref 0.52–1.04)
GFR SERPL CREATININE-BSD FRML MDRD: >90 ML/MIN/1.73M2
GLUCOSE BLD-MCNC: 94 MG/DL (ref 70–99)
POTASSIUM BLD-SCNC: 4.1 MMOL/L (ref 3.4–5.3)
SARS-COV-2 RNA RESP QL NAA+PROBE: NEGATIVE
SODIUM SERPL-SCNC: 137 MMOL/L (ref 133–144)

## 2022-09-26 NOTE — RESULT ENCOUNTER NOTE
Holly SHELTON Super    Covid screen is negative/normal  Blood count stable  Electrolytes and kidney function look great.     Sincerely,       SANTIAGO MORLEY M.D.

## 2022-11-07 ENCOUNTER — TELEPHONE (OUTPATIENT)
Dept: FAMILY MEDICINE | Facility: CLINIC | Age: 72
End: 2022-11-07

## 2022-11-07 NOTE — TELEPHONE ENCOUNTER
Detailed voicemail left for Glenna, with [facility] Noting that Dr. Fierro will follow patient for home care services.    TI CondonN RN  [facility], [facility]

## 2022-11-07 NOTE — TELEPHONE ENCOUNTER
Reason for Call:  Home care services     Detailed comments: glenna calling from Home health care [facility] calling to see if PCP will follow this patient for home care services    Phone Number Patient can be reached at: Other phone number:  Glenna can be reached at [phone]    Best Time: Day     Can we leave a detailed message on this number? YES    Call taken on 11/7/2022 at 11:52 AM by BASHIR VASQUEZ

## 2023-04-05 ENCOUNTER — TELEPHONE (OUTPATIENT)
Dept: INTERNAL MEDICINE | Facility: CLINIC | Age: 73
End: 2023-04-05
Payer: COMMERCIAL

## 2023-04-05 DIAGNOSIS — F41.9 ANXIETY: ICD-10-CM

## 2023-04-05 DIAGNOSIS — J45.20 MILD INTERMITTENT ASTHMA WITHOUT COMPLICATION: Primary | ICD-10-CM

## 2023-04-05 DIAGNOSIS — I10 HYPERTENSION GOAL BP (BLOOD PRESSURE) < 140/90: ICD-10-CM

## 2023-04-05 NOTE — TELEPHONE ENCOUNTER
Reason for Call:  Appointment Request    Patient requesting this type of appt:  Hospital follow up and TCU follow up    She is out of medications that the TCU proscribed. She couldn't pronounce the medications.    [id number] - [place], [place] - [address] AT [facility] [address]    The pharmacy said she needs to get the refill on those medications from her Provider.  Requested provider: Dora Fierro  Reason patient unable to be scheduled: Needs to be scheduled by clinic  When does patient want to be seen/preferred time: soon  Comments: Her Surgeon's office and her health insurance want her to to get in to be seen.     Okay to leave a detailed message?: Yes at Home number on file [phone] (home)    Call taken on 4/5/2023 at 4:35 PM by Dion Castillo

## 2023-04-06 NOTE — TELEPHONE ENCOUNTER
Attempted to call pt, left vm asking call back and ask for RN.     Thanks,  DELFINO De  [facility]

## 2023-04-07 PROBLEM — Q35.9 CLEFT PALATE: Status: ACTIVE | Noted: 2019-10-15

## 2023-04-07 PROBLEM — K62.3 RECTAL PROLAPSE: Status: ACTIVE | Noted: 2022-09-29

## 2023-04-07 RX ORDER — SERTRALINE HYDROCHLORIDE 25 MG/1
1 TABLET, FILM COATED ORAL DAILY
COMMUNITY
Start: 2022-10-19 | End: 2023-04-07

## 2023-04-07 RX ORDER — CLONIDINE HYDROCHLORIDE 0.1 MG/1
0.1 TABLET ORAL 3 TIMES DAILY
Qty: 90 TABLET | Refills: 0 | Status: SHIPPED | OUTPATIENT
Start: 2023-04-07 | End: 2023-04-11

## 2023-04-07 RX ORDER — SERTRALINE HYDROCHLORIDE 25 MG/1
TABLET, FILM COATED ORAL
Qty: 30 TABLET | Refills: 0 | Status: SHIPPED | OUTPATIENT
Start: 2023-04-07 | End: 2023-04-11

## 2023-04-07 RX ORDER — LOSARTAN POTASSIUM 100 MG/1
100 TABLET ORAL DAILY
Qty: 30 TABLET | Refills: 0 | Status: SHIPPED | OUTPATIENT
Start: 2023-04-07 | End: 2023-04-11

## 2023-04-07 RX ORDER — ALBUTEROL SULFATE 90 UG/1
2 AEROSOL, METERED RESPIRATORY (INHALATION)
COMMUNITY
Start: 2022-10-19 | End: 2023-04-07

## 2023-04-07 RX ORDER — QUETIAPINE FUMARATE 25 MG/1
12.5 TABLET, FILM COATED ORAL 2 TIMES DAILY
Qty: 30 TABLET | Refills: 0 | Status: SHIPPED | OUTPATIENT
Start: 2023-04-07 | End: 2023-04-11

## 2023-04-07 RX ORDER — ATENOLOL 50 MG/1
50 TABLET ORAL
Qty: 8 TABLET | Refills: 0 | Status: SHIPPED | OUTPATIENT
Start: 2023-04-10 | End: 2023-04-11

## 2023-04-07 RX ORDER — LOSARTAN POTASSIUM 100 MG/1
1 TABLET ORAL DAILY
COMMUNITY
Start: 2022-10-19 | End: 2023-04-07

## 2023-04-07 RX ORDER — QUETIAPINE FUMARATE 25 MG/1
12.5 TABLET, FILM COATED ORAL
COMMUNITY
Start: 2022-10-19 | End: 2023-04-07

## 2023-04-07 RX ORDER — AMLODIPINE BESYLATE 5 MG/1
1 TABLET ORAL DAILY
COMMUNITY
Start: 2022-10-19 | End: 2023-04-07

## 2023-04-07 RX ORDER — ACETAMINOPHEN 500 MG
500 TABLET ORAL EVERY 6 HOURS
COMMUNITY
Start: 2022-10-19

## 2023-04-07 RX ORDER — AMLODIPINE BESYLATE 5 MG/1
5 TABLET ORAL 2 TIMES DAILY
Qty: 30 TABLET | Refills: 0 | Status: SHIPPED | OUTPATIENT
Start: 2023-04-07

## 2023-04-07 RX ORDER — FLUTICASONE PROPIONATE 50 MCG
1 SPRAY, SUSPENSION (ML) NASAL DAILY
Qty: 16 G | Refills: 0 | Status: SHIPPED | OUTPATIENT
Start: 2023-04-07 | End: 2023-04-11

## 2023-04-07 RX ORDER — CLONIDINE HYDROCHLORIDE 0.1 MG/1
0.1 TABLET ORAL
COMMUNITY
Start: 2022-10-19 | End: 2023-04-07

## 2023-04-07 NOTE — TELEPHONE ENCOUNTER
Spoke with patient stated she does not know what medications she needs refilled they came in a pill pack and she is out now.The only thing she knows she needs is her Flonase for her allergies. Per pt stated she was at the [facility] the last time the medication were ordered.     Reached out to TCU ([phone]) to have med list faxed to office. Per TCU stated that pt has not been there for a long time. Med list may have been update since than.     Called [facility] and per [facility] they do not have recent medication on file for patient.     Patient asking if she would be able to establish care and do a follow up with Dr. Wolfe. Dr. Wolfe can we use one of your virtual spots next week for a est care/follow up? Patient needs medication and I cannot determine/find which medication she is referring to. Please advise.     Team pt ok with detailed voice mails.     Thanks,  DELFINO De  [facility]

## 2023-04-07 NOTE — TELEPHONE ENCOUNTER
Spoke with pt and notified, helped schedule.     Thanks,  DELFINO De  [facility]

## 2023-04-07 NOTE — TELEPHONE ENCOUNTER
Sound like discharge summary in Care Everywhere is the best place to find current medications. I updated medication list and sent 30 days refills. Schedule appointment with Dr. Wolfe.  Suyapa Raza MD

## 2023-04-11 ENCOUNTER — OFFICE VISIT (OUTPATIENT)
Dept: FAMILY MEDICINE | Facility: CLINIC | Age: 73
End: 2023-04-11
Payer: COMMERCIAL

## 2023-04-11 VITALS
WEIGHT: 83.4 LBS | SYSTOLIC BLOOD PRESSURE: 144 MMHG | BODY MASS INDEX: 16.37 KG/M2 | HEIGHT: 60 IN | TEMPERATURE: 98.4 F | RESPIRATION RATE: 14 BRPM | OXYGEN SATURATION: 100 % | HEART RATE: 83 BPM | DIASTOLIC BLOOD PRESSURE: 86 MMHG

## 2023-04-11 DIAGNOSIS — J45.20 MILD INTERMITTENT ASTHMA WITHOUT COMPLICATION: ICD-10-CM

## 2023-04-11 DIAGNOSIS — I10 HYPERTENSION GOAL BP (BLOOD PRESSURE) < 140/90: ICD-10-CM

## 2023-04-11 DIAGNOSIS — F41.9 ANXIETY: ICD-10-CM

## 2023-04-11 PROCEDURE — 99214 OFFICE O/P EST MOD 30 MIN: CPT | Performed by: STUDENT IN AN ORGANIZED HEALTH CARE EDUCATION/TRAINING PROGRAM

## 2023-04-11 RX ORDER — QUETIAPINE FUMARATE 25 MG/1
12.5 TABLET, FILM COATED ORAL 2 TIMES DAILY
Qty: 60 TABLET | Refills: 1 | Status: SHIPPED | OUTPATIENT
Start: 2023-04-11

## 2023-04-11 RX ORDER — SERTRALINE HYDROCHLORIDE 25 MG/1
TABLET, FILM COATED ORAL
Qty: 90 TABLET | Refills: 1 | Status: SHIPPED | OUTPATIENT
Start: 2023-04-11

## 2023-04-11 RX ORDER — ACETAMINOPHEN 500 MG
500 TABLET ORAL EVERY 6 HOURS
Status: CANCELLED | OUTPATIENT
Start: 2023-04-11

## 2023-04-11 RX ORDER — CLONIDINE HYDROCHLORIDE 0.1 MG/1
0.1 TABLET ORAL 3 TIMES DAILY
Qty: 270 TABLET | Refills: 1 | Status: SHIPPED | OUTPATIENT
Start: 2023-04-11

## 2023-04-11 RX ORDER — LOSARTAN POTASSIUM 100 MG/1
100 TABLET ORAL DAILY
Qty: 90 TABLET | Refills: 1 | Status: SHIPPED | OUTPATIENT
Start: 2023-04-11

## 2023-04-11 RX ORDER — ALPRAZOLAM 0.25 MG
0.25 TABLET ORAL 2 TIMES DAILY PRN
Qty: 60 TABLET | Refills: 1 | Status: CANCELLED | OUTPATIENT
Start: 2023-04-11

## 2023-04-11 RX ORDER — ATENOLOL 50 MG/1
50 TABLET ORAL
Qty: 20 TABLET | Refills: 1 | Status: SHIPPED | OUTPATIENT
Start: 2023-04-13

## 2023-04-11 RX ORDER — FLUTICASONE PROPIONATE 50 MCG
1 SPRAY, SUSPENSION (ML) NASAL DAILY
Qty: 16 G | Refills: 3 | Status: SHIPPED | OUTPATIENT
Start: 2023-04-11

## 2023-04-11 RX ORDER — AMLODIPINE BESYLATE 5 MG/1
5 TABLET ORAL 2 TIMES DAILY
Qty: 30 TABLET | Refills: 0 | Status: CANCELLED | OUTPATIENT
Start: 2023-04-11

## 2023-04-11 ASSESSMENT — ANXIETY QUESTIONNAIRES
GAD7 TOTAL SCORE: 6
7. FEELING AFRAID AS IF SOMETHING AWFUL MIGHT HAPPEN: NOT AT ALL
2. NOT BEING ABLE TO STOP OR CONTROL WORRYING: SEVERAL DAYS
6. BECOMING EASILY ANNOYED OR IRRITABLE: SEVERAL DAYS
GAD7 TOTAL SCORE: 6
5. BEING SO RESTLESS THAT IT IS HARD TO SIT STILL: NOT AT ALL
3. WORRYING TOO MUCH ABOUT DIFFERENT THINGS: SEVERAL DAYS
IF YOU CHECKED OFF ANY PROBLEMS ON THIS QUESTIONNAIRE, HOW DIFFICULT HAVE THESE PROBLEMS MADE IT FOR YOU TO DO YOUR WORK, TAKE CARE OF THINGS AT HOME, OR GET ALONG WITH OTHER PEOPLE: SOMEWHAT DIFFICULT
1. FEELING NERVOUS, ANXIOUS, OR ON EDGE: SEVERAL DAYS

## 2023-04-11 ASSESSMENT — PATIENT HEALTH QUESTIONNAIRE - PHQ9: 5. POOR APPETITE OR OVEREATING: MORE THAN HALF THE DAYS

## 2023-04-11 ASSESSMENT — ASTHMA QUESTIONNAIRES: ACT_TOTALSCORE: 24

## 2023-04-11 NOTE — PROGRESS NOTES
Assessment & Plan     (F41.9) Anxiety  Comment: Stable. Robby-7 score noted to bed 6 today. Pt reports needing medication refills. Will send to pharmacy   Plan: QUEtiapine (SEROQUEL) 25 MG tablet, sertraline         (ZOLOFT) 25 MG tablet, PRIMARY CARE FOLLOW-UP         SCHEDULING            (I10) Hypertension goal BP (blood pressure) < 140/90  Comment: Chronic, uncontrolled. Pt states she takes her BP at home. Did encourage her to bring in a log so we can compare at home to in office numbers. Pt wanting to hold taking Amlodipine at this time and continue with clonidine, losartan and atenolol. Did encourage adequate dietary intake and monitoring of blood pressure has amlodipine is being held.  Plan: atenolol (TENORMIN) 50 MG tablet, cloNIDine         (CATAPRES) 0.1 MG tablet, losartan (COZAAR) 100        MG tablet, PRIMARY CARE FOLLOW-UP SCHEDULING        Pending pickup of medications from pharmacy      (J45.20) Mild intermittent asthma without complication  Comment: Stable  Plan: fluticasone (FLONASE) 50 MCG/ACT nasal spray,         PRIMARY CARE FOLLOW-UP SCHEDULING        Pending pickup of medications from pharmacy                     ALINA GUERRA MD  [facility] SAMANTHA Barrera is a 72 year old, presenting for the following health issues:  Refill Request    HPI     Hypertension Follow-up      Do you check your blood pressure regularly outside of the clinic? Yes     Are you following a low salt diet? No    Are your blood pressures ever more than 140 on the top number (systolic) OR more   than 90 on the bottom number (diastolic), for example 140/90? Yes    Anxiety Follow-Up    How are you doing with your anxiety since your last visit? Worsened.     Are you having other symptoms that might be associated with anxiety? No    Have you had a significant life event? No     Are you feeling depressed? No    Do you have any concerns with your use of alcohol or other drugs? No    Social  History     Tobacco Use     Smoking status: Former     Packs/day: 0.00     Types: Cigarettes     Quit date: 1960     Years since quittin.4     Smokeless tobacco: Never   Vaping Use     Vaping status: Never Used   Substance Use Topics     Alcohol use: Yes     Comment: seldom     Drug use: No         2012     1:33 PM 2023     4:19 PM   RITU-7 SCORE   Total Score 12    Total Score  6          View : No data to display.                    Medication Followup of Fluticasone     Taking Medication as prescribed: yes    Side Effects:  None    Medication Helping Symptoms:  yes        Review of Systems   Constitutional, HEENT, cardiovascular, pulmonary, gi and gu systems are negative, except as otherwise noted.      Objective    BP (!) 166/73   Pulse 83   Temp 98.4  F (36.9  C) (Temporal)   Resp 14   Ht 1.524 m (5')   Wt 37.8 kg (83 lb 6.4 oz)   LMP  (LMP Unknown)   SpO2 100%   BMI 16.29 kg/m    Body mass index is 16.29 kg/m .  Physical Exam   GENERAL: alert, no distress and underweight   EYES: Eyes grossly normal to inspection, PERRL and conjunctivae and sclerae normal  MS: no gross musculoskeletal defects noted, no edema  NEURO: Normal strength and tone, mentation intact and speech normal  PSYCH: mentation appears normal, affect normal/bright    No results found for any visits on 23.

## 2023-04-29 ENCOUNTER — TELEPHONE (OUTPATIENT)
Dept: INTERNAL MEDICINE | Facility: CLINIC | Age: 73
End: 2023-04-29
Payer: COMMERCIAL

## 2023-04-29 DIAGNOSIS — I10 HYPERTENSION GOAL BP (BLOOD PRESSURE) < 140/90: ICD-10-CM

## 2023-05-01 NOTE — TELEPHONE ENCOUNTER
"Called patient, left message to call back at [phone]. Called to clarify refill request for Amlodipine as provider had discontinued amlodipine at 4/11/23 visit. See notes from appointment 4/11/23 with Dr. Wolfe:    \" (I10) Hypertension goal BP (blood pressure) < 140/90  Comment: Chronic, uncontrolled. Pt states she takes her BP at home. Did encourage her to bring in a log so we can compare at home to in office numbers. Pt wanting to hold taking Amlodipine at this time and continue with clonidine, losartan and atenolol. Did encourage adequate dietary intake and monitoring of blood pressure has amlodipine is being held.  Plan: atenolol (TENORMIN) 50 MG tablet, cloNIDine         (CATAPRES) 0.1 MG tablet, losartan (COZAAR) 100        MG tablet, PRIMARY CARE FOLLOW-UP SCHEDULING        Pending pickup of medications from pharmacy\"    REGINE Mulligan RN  [facility]  "

## 2023-05-02 NOTE — TELEPHONE ENCOUNTER
Attempted to call patient but no answer and call did not go to VM  Unable to leave a VM  Will need to try again another time      Dora Perez RN  [facility]

## 2023-05-03 RX ORDER — AMLODIPINE BESYLATE 5 MG/1
TABLET ORAL
Qty: 30 TABLET | Refills: 0 | OUTPATIENT
Start: 2023-05-03

## 2023-05-03 NOTE — TELEPHONE ENCOUNTER
Attempted to call patient a 3rd time but no answer and did not go to   Unable to leave a VM    Dora Perez RN  [facility]

## 2023-06-08 DIAGNOSIS — I10 HYPERTENSION GOAL BP (BLOOD PRESSURE) < 140/90: ICD-10-CM

## 2023-06-08 NOTE — LETTER
June 12, 2023      Holly Reina  [address]   [place] [address]        Dear Holly,     We have attempted to reach out to you regarding medication.     We received a refill request from [facility] for amlodipine, however, this medication was held at last visit with your provider.     If you re-started the medication and need refills please call the clinic and ask for a nurse [phone]            Thank you for choosing us your partner in health care.     [facility] Care Team

## 2023-06-12 RX ORDER — AMLODIPINE BESYLATE 5 MG/1
TABLET ORAL
Qty: 60 TABLET | Refills: 0 | OUTPATIENT
Start: 2023-06-12

## 2023-06-12 NOTE — TELEPHONE ENCOUNTER
Attempted to call x2 and unable to leave vm as no vm set up.     Team- letter cued, please when able print and mail to address on file.     Thanks,  DELFINO De  [facility]

## 2025-03-18 ENCOUNTER — OFFICE VISIT (OUTPATIENT)
Dept: FAMILY MEDICINE | Facility: CLINIC | Age: 75
End: 2025-03-18
Payer: COMMERCIAL

## 2025-03-18 VITALS
HEART RATE: 98 BPM | DIASTOLIC BLOOD PRESSURE: 86 MMHG | RESPIRATION RATE: 16 BRPM | HEIGHT: 60 IN | OXYGEN SATURATION: 100 % | WEIGHT: 86.2 LBS | SYSTOLIC BLOOD PRESSURE: 132 MMHG | BODY MASS INDEX: 16.92 KG/M2 | TEMPERATURE: 98.8 F

## 2025-03-18 DIAGNOSIS — Z99.89 DEPENDENCE ON WALKING STICK: ICD-10-CM

## 2025-03-18 DIAGNOSIS — M81.0 AGE-RELATED OSTEOPOROSIS WITHOUT CURRENT PATHOLOGICAL FRACTURE: ICD-10-CM

## 2025-03-18 DIAGNOSIS — R63.6 UNDERWEIGHT (BMI < 18.5): ICD-10-CM

## 2025-03-18 DIAGNOSIS — I10 HYPERTENSION GOAL BP (BLOOD PRESSURE) < 140/90: ICD-10-CM

## 2025-03-18 DIAGNOSIS — Z00.00 ENCOUNTER FOR MEDICARE ANNUAL WELLNESS EXAM: Primary | ICD-10-CM

## 2025-03-18 DIAGNOSIS — F41.9 ANXIETY: ICD-10-CM

## 2025-03-18 DIAGNOSIS — J45.20 MILD INTERMITTENT ASTHMA WITHOUT COMPLICATION: ICD-10-CM

## 2025-03-18 LAB
BASOPHILS # BLD AUTO: 0 10E3/UL (ref 0–0.2)
BASOPHILS NFR BLD AUTO: 1 %
EOSINOPHIL # BLD AUTO: 0.1 10E3/UL (ref 0–0.7)
EOSINOPHIL NFR BLD AUTO: 2 %
ERYTHROCYTE [DISTWIDTH] IN BLOOD BY AUTOMATED COUNT: 14.5 % (ref 10–15)
HCT VFR BLD AUTO: 37.2 % (ref 35–47)
HGB BLD-MCNC: 11.6 G/DL (ref 11.7–15.7)
IMM GRANULOCYTES # BLD: 0 10E3/UL
IMM GRANULOCYTES NFR BLD: 0 %
LYMPHOCYTES # BLD AUTO: 2.6 10E3/UL (ref 0.8–5.3)
LYMPHOCYTES NFR BLD AUTO: 47 %
MCH RBC QN AUTO: 27.2 PG (ref 26.5–33)
MCHC RBC AUTO-ENTMCNC: 31.2 G/DL (ref 31.5–36.5)
MCV RBC AUTO: 87 FL (ref 78–100)
MONOCYTES # BLD AUTO: 0.5 10E3/UL (ref 0–1.3)
MONOCYTES NFR BLD AUTO: 9 %
NEUTROPHILS # BLD AUTO: 2.3 10E3/UL (ref 1.6–8.3)
NEUTROPHILS NFR BLD AUTO: 42 %
PLATELET # BLD AUTO: 208 10E3/UL (ref 150–450)
RBC # BLD AUTO: 4.27 10E6/UL (ref 3.8–5.2)
WBC # BLD AUTO: 5.5 10E3/UL (ref 4–11)

## 2025-03-18 PROCEDURE — 3075F SYST BP GE 130 - 139MM HG: CPT | Performed by: STUDENT IN AN ORGANIZED HEALTH CARE EDUCATION/TRAINING PROGRAM

## 2025-03-18 PROCEDURE — G0438 PPPS, INITIAL VISIT: HCPCS | Performed by: STUDENT IN AN ORGANIZED HEALTH CARE EDUCATION/TRAINING PROGRAM

## 2025-03-18 PROCEDURE — 3079F DIAST BP 80-89 MM HG: CPT | Performed by: STUDENT IN AN ORGANIZED HEALTH CARE EDUCATION/TRAINING PROGRAM

## 2025-03-18 PROCEDURE — G2211 COMPLEX E/M VISIT ADD ON: HCPCS | Performed by: STUDENT IN AN ORGANIZED HEALTH CARE EDUCATION/TRAINING PROGRAM

## 2025-03-18 PROCEDURE — 99214 OFFICE O/P EST MOD 30 MIN: CPT | Mod: 25 | Performed by: STUDENT IN AN ORGANIZED HEALTH CARE EDUCATION/TRAINING PROGRAM

## 2025-03-18 PROCEDURE — 36415 COLL VENOUS BLD VENIPUNCTURE: CPT | Performed by: STUDENT IN AN ORGANIZED HEALTH CARE EDUCATION/TRAINING PROGRAM

## 2025-03-18 PROCEDURE — 85025 COMPLETE CBC W/AUTO DIFF WBC: CPT | Performed by: STUDENT IN AN ORGANIZED HEALTH CARE EDUCATION/TRAINING PROGRAM

## 2025-03-18 RX ORDER — LOSARTAN POTASSIUM 100 MG/1
100 TABLET ORAL DAILY
Qty: 90 TABLET | Refills: 3 | Status: SHIPPED | OUTPATIENT
Start: 2025-03-18

## 2025-03-18 RX ORDER — CLONIDINE HYDROCHLORIDE 0.1 MG/1
0.1 TABLET ORAL 3 TIMES DAILY
Qty: 270 TABLET | Refills: 3 | Status: SHIPPED | OUTPATIENT
Start: 2025-03-18

## 2025-03-18 RX ORDER — FLUTICASONE PROPIONATE 50 MCG
1 SPRAY, SUSPENSION (ML) NASAL DAILY
Qty: 16 G | Refills: 3 | Status: SHIPPED | OUTPATIENT
Start: 2025-03-18

## 2025-03-18 RX ORDER — SERTRALINE HYDROCHLORIDE 25 MG/1
TABLET, FILM COATED ORAL
Qty: 90 TABLET | Refills: 3 | Status: SHIPPED | OUTPATIENT
Start: 2025-03-18

## 2025-03-18 RX ORDER — ATENOLOL 50 MG/1
50 TABLET ORAL
Qty: 20 TABLET | Refills: 3 | Status: SHIPPED | OUTPATIENT
Start: 2025-03-20

## 2025-03-18 SDOH — HEALTH STABILITY: PHYSICAL HEALTH: ON AVERAGE, HOW MANY MINUTES DO YOU ENGAGE IN EXERCISE AT THIS LEVEL?: 20 MIN

## 2025-03-18 SDOH — HEALTH STABILITY: PHYSICAL HEALTH: ON AVERAGE, HOW MANY DAYS PER WEEK DO YOU ENGAGE IN MODERATE TO STRENUOUS EXERCISE (LIKE A BRISK WALK)?: 2 DAYS

## 2025-03-18 ASSESSMENT — SOCIAL DETERMINANTS OF HEALTH (SDOH): HOW OFTEN DO YOU GET TOGETHER WITH FRIENDS OR RELATIVES?: MORE THAN THREE TIMES A WEEK

## 2025-03-18 NOTE — PATIENT INSTRUCTIONS
Patient Education   Preventive Care Advice   This is general advice given by our system to help you stay healthy. However, your care team may have specific advice just for you. Please talk to your care team about your preventive care needs.  Nutrition  Eat 5 or more servings of fruits and vegetables each day.  Try wheat bread, brown rice and whole grain pasta (instead of white bread, rice, and pasta).  Get enough calcium and vitamin D. Check the label on foods and aim for 100% of the RDA (recommended daily allowance).  Lifestyle  Exercise at least 150 minutes each week  (30 minutes a day, 5 days a week).  Do muscle strengthening activities 2 days a week. These help control your weight and prevent disease.  No smoking.  Wear sunscreen to prevent skin cancer.  Have a dental exam and cleaning every 6 months.  Yearly exams  See your health care team every year to talk about:  Any changes in your health.  Any medicines your care team has prescribed.  Preventive care, family planning, and ways to prevent chronic diseases.  Shots (vaccines)   HPV shots (up to age 26), if you've never had them before.  Hepatitis B shots (up to age 59), if you've never had them before.  COVID-19 shot: Get this shot when it's due.  Flu shot: Get a flu shot every year.  Tetanus shot: Get a tetanus shot every 10 years.  Pneumococcal, hepatitis A, and RSV shots: Ask your care team if you need these based on your risk.  Shingles shot (for age 50 and up)  General health tests  Diabetes screening:  Starting at age 35, Get screened for diabetes at least every 3 years.  If you are younger than age 35, ask your care team if you should be screened for diabetes.  Cholesterol test: At age 39, start having a cholesterol test every 5 years, or more often if advised.  Bone density scan (DEXA): At age 50, ask your care team if you should have this scan for osteoporosis (brittle bones).  Hepatitis C: Get tested at least once in your life.  STIs (sexually  transmitted infections)  Before age 24: Ask your care team if you should be screened for STIs.  After age 24: Get screened for STIs if you're at risk. You are at risk for STIs (including HIV) if:  You are sexually active with more than one person.  You don't use condoms every time.  You or a partner was diagnosed with a sexually transmitted infection.  If you are at risk for HIV, ask about PrEP medicine to prevent HIV.  Get tested for HIV at least once in your life, whether you are at risk for HIV or not.  Cancer screening tests  Cervical cancer screening: If you have a cervix, begin getting regular cervical cancer screening tests starting at age 21.  Breast cancer scan (mammogram): If you've ever had breasts, begin having regular mammograms starting at age 40. This is a scan to check for breast cancer.  Colon cancer screening: It is important to start screening for colon cancer at age 45.  Have a colonoscopy test every 10 years (or more often if you're at risk) Or, ask your provider about stool tests like a FIT test every year or Cologuard test every 3 years.  To learn more about your testing options, visit:   .  For help making a decision, visit:   https://bit.ly/yy94852.  Prostate cancer screening test: If you have a prostate, ask your care team if a prostate cancer screening test (PSA) at age 55 is right for you.  Lung cancer screening: If you are a current or former smoker ages 50 to 80, ask your care team if ongoing lung cancer screenings are right for you.  For informational purposes only. Not to replace the advice of your health care provider. Copyright   2023 [facility]. All rights reserved. Clinically reviewed by the [facility]. [employer] [id number] - REV 01/24.  Learning About Activities of Daily Living  What are activities of daily living?     Activities of daily living (ADLs) are the basic self-care tasks you do every day. These include eating, bathing, dressing,  and moving around.  As you age, and if you have health problems, you may find that it's harder to do some of these tasks. If so, your doctor can suggest ideas that may help.  To measure what kind of help you may need, your doctor will ask how well you are able to do ADLs. Let your doctor know if there are any tasks that you are having trouble doing. This is an important first step to getting help. And when you have the help you need, you can stay as independent as possible.  How will a doctor assess your ADLs?  Asking about ADLs is part of a routine health checkup your doctor will likely do as you age. Your health check might be done in a doctor's office, in your home, or at a hospital. The goal is to find out if you are having any problems that could make it hard to care for yourself or that make it unsafe for you to be on your own.  To measure your ADLs, your doctor will ask how hard it is for you to do routine tasks. Your doctor may also want to know if you have changed the way you do a task because of a health problem. Your doctor may watch how you:  Walk back and forth.  Keep your balance while you stand or walk.  Move from sitting to standing or from a bed to a chair.  Button or unbutton a shirt or sweater.  Remove and put on your shoes.  It's common to feel a little worried or anxious if you find you can't do all the things you used to be able to do. Talking with your doctor about ADLs is a way to make sure you're as safe as possible and able to care for yourself as well as you can. You may want to bring a caregiver, friend, or family member to your checkup. They can help you talk to your doctor.  Follow-up care is a key part of your treatment and safety. Be sure to make and go to all appointments, and call your doctor if you are having problems. It's also a good idea to know your test results and keep a list of the medicines you take.  Current as of: October 24, 2024  Content Version: 14.4    5918-4444  [employer].   Care instructions adapted under license by your healthcare professional. If you have questions about a medical condition or this instruction, always ask your healthcare professional. [employer] disclaims any warranty or liability for your use of this information.    Preventing Falls: Care Instructions  Injuries and health problems such as trouble walking or poor eyesight can increase your risk of falling. So can some medicines. But there are things you can do to help prevent falls. You can exercise to get stronger. You can also arrange your home to make it safer.    Talk to your doctor about the medicines you take. Ask if any of them increase the risk of falls and whether they can be changed or stopped.   Try to exercise regularly. It can help improve your strength and balance. This can help lower your risk of falling.         Practice fall safety and prevention.   Wear low-heeled shoes that fit well and give your feet good support. Talk to your doctor if you have foot problems that make this hard.  Carry a cellphone or wear a medical alert device that you can use to call for help.  Use stepladders instead of chairs to reach high objects. Don't climb if you're at risk for falls. Ask for help, if needed.  Wear the correct eyeglasses, if you need them.        Make your home safer.   Remove rugs, cords, clutter, and furniture from walkways.  Keep your house well lit. Use night-lights in hallways and bathrooms.  Install and use sturdy handrails on stairways.  Wear nonskid footwear, even inside. Don't walk barefoot or in socks without shoes.        Be safe outside.   Use handrails, curb cuts, and ramps whenever possible.  Keep your hands free by using a shoulder bag or backpack.  Try to walk in well-lit areas. Watch out for uneven ground, changes in pavement, and debris.  Be careful in the winter. Walk on the grass or gravel when sidewalks are slippery. Use de-icer on steps and  "walkways. Add non-slip devices to shoes.    Put grab bars and nonskid mats in your shower or tub and near the toilet. Try to use a shower chair or bath bench when bathing.   Get into a tub or shower by putting in your weaker leg first. Get out with your strong side first. Have a phone or medical alert device in the bathroom with you.   Where can you learn more?  Go to https://www.[employer]/patiented  Enter G117 in the search box to learn more about \"Preventing Falls: Care Instructions.\"  Current as of: July 31, 2024  Content Version: 14.4    6270-2086 [employer].   Care instructions adapted under license by your healthcare professional. If you have questions about a medical condition or this instruction, always ask your healthcare professional. [employer] disclaims any warranty or liability for your use of this information.    Learning About Stress  What is stress?     Stress is your body's response to a hard situation. Your body can have a physical, emotional, or mental response. Stress is a fact of life for most people, and it affects everyone differently. What causes stress for you may not be stressful for someone else.  A lot of things can cause stress. You may feel stress when you go on a job interview, take a test, or run a race. This kind of short-term stress is normal and even useful. It can help you if you need to work hard or react quickly. For example, stress can help you finish an important job on time.  Long-term stress is caused by ongoing stressful situations or events. Examples of long-term stress include long-term health problems, ongoing problems at work, or conflicts in your family. Long-term stress can harm your health.  How does stress affect your health?  When you are stressed, your body responds as though you are in danger. It makes hormones that speed up your heart, make you breathe faster, and give you a burst of energy. This is called the fight-or-flight stress " response. If the stress is over quickly, your body goes back to normal and no harm is done.  But if stress happens too often or lasts too long, it can have bad effects. Long-term stress can make you more likely to get sick, and it can make symptoms of some diseases worse. If you tense up when you are stressed, you may develop neck, shoulder, or low back pain. Stress is linked to high blood pressure and heart disease.  Stress also harms your emotional health. It can make you patricio, tense, or depressed. Your relationships may suffer, and you may not do well at work or school.  What can you do to manage stress?  You can try these things to help manage stress:   Do something active. Exercise or activity can help reduce stress. Walking is a great way to get started. Even everyday activities such as housecleaning or yard work can help.  Try yoga or iban chi. These techniques combine exercise and meditation. You may need some training at first to learn them.  Do something you enjoy. For example, listen to music or go to a movie. Practice your hobby or do volunteer work.  Meditate. This can help you relax, because you are not worrying about what happened before or what may happen in the future.  Do guided imagery. Imagine yourself in any setting that helps you feel calm. You can use online videos, books, or a teacher to guide you.  Do breathing exercises. For example:  From a standing position, bend forward from the waist with your knees slightly bent. Let your arms dangle close to the floor.  Breathe in slowly and deeply as you return to a standing position. Roll up slowly and lift your head last.  Hold your breath for just a few seconds in the standing position.  Breathe out slowly and bend forward from the waist.  Let your feelings out. Talk, laugh, cry, and express anger when you need to. Talking with supportive friends or family, a counselor, or a yuli leader about your feelings is a healthy way to relieve stress.  "Avoid discussing your feelings with people who make you feel worse.  Write. It may help to write about things that are bothering you. This helps you find out how much stress you feel and what is causing it. When you know this, you can find better ways to cope.  What can you do to prevent stress?  You might try some of these things to help prevent stress:  Manage your time. This helps you find time to do the things you want and need to do.  Get enough sleep. Your body recovers from the stresses of the day while you are sleeping.  Get support. Your family, friends, and community can make a difference in how you experience stress.  Limit your news feed. Avoid or limit time on social media or news that may make you feel stressed.  Do something active. Exercise or activity can help reduce stress. Walking is a great way to get started.  Where can you learn more?  Go to https://www.1DocWay.[employer]/patiented  Enter N032 in the search box to learn more about \"Learning About Stress.\"  Current as of: October 24, 2024  Content Version: 14.4    7513-9757 [employer].   Care instructions adapted under license by your healthcare professional. If you have questions about a medical condition or this instruction, always ask your healthcare professional. [employer] disclaims any warranty or liability for your use of this information.    Learning About Sleeping Well  What does sleeping well mean?     Sleeping well means getting enough sleep to feel good and stay healthy. How much sleep is enough varies among people.  The number of hours you sleep and how you feel when you wake up are both important. If you do not feel refreshed, you probably need more sleep. Another sign of not getting enough sleep is feeling tired during the day.  Experts recommend that adults get at least 7 or more hours of sleep per day. Children and older adults need more sleep.  Why is getting enough sleep important?  Getting enough quality " "sleep is a basic part of good health. When your sleep suffers, your physical health, mood, and your thoughts can suffer too. You may find yourself feeling more grumpy or stressed. Not getting enough sleep also can lead to serious problems, including injury, accidents, anxiety, and depression.  What might cause poor sleeping?  Many things can cause sleep problems, including:  Changes to your sleep schedule.  Stress. Stress can be caused by fear about a single event, such as giving a speech. Or you may have ongoing stress, such as worry about work or school.  Depression, anxiety, and other mental or emotional conditions.  Changes in your sleep habits or surroundings. This includes changes that happen where you sleep, such as noise, light, or sleeping in a different bed. It also includes changes in your sleep pattern, such as having jet lag or working a late shift.  Health problems, such as pain, breathing problems, and restless legs syndrome.  Lack of regular exercise.  Using alcohol, nicotine, or caffeine before bed.  How can you help yourself?  Here are some tips that may help you sleep more soundly and wake up feeling more refreshed.  Your sleeping area   Use your bedroom only for sleeping and sex. A bit of light reading may help you fall asleep. But if it doesn't, do your reading elsewhere in the house. Try not to use your TV, computer, smartphone, or tablet while you are in bed.  Be sure your bed is big enough to stretch out comfortably, especially if you have a sleep partner.  Keep your bedroom quiet, dark, and cool. Use curtains, blinds, or a sleep mask to block out light. To block out noise, use earplugs, soothing music, or a \"white noise\" machine.  Your evening and bedtime routine   Create a relaxing bedtime routine. You might want to take a warm shower or bath, or listen to soothing music.  Go to bed at the same time every night. And get up at the same time every morning, even if you feel tired.  What to " "avoid   Limit caffeine (coffee, tea, caffeinated sodas) during the day, and don't have any for at least 6 hours before bedtime.  Avoid drinking alcohol before bedtime. Alcohol can cause you to wake up more often during the night.  Try not to smoke or use tobacco, especially in the evening. Nicotine can keep you awake.  Limit naps during the day, especially close to bedtime.  Avoid lying in bed awake for too long. If you can't fall asleep or if you wake up in the middle of the night and can't get back to sleep within about 20 minutes, get out of bed and go to another room until you feel sleepy.  Avoid taking medicine right before bed that may keep you awake or make you feel hyper or energized. Your doctor can tell you if your medicine may do this and if you can take it earlier in the day.  If you can't sleep   Imagine yourself in a peaceful, pleasant scene. Focus on the details and feelings of being in a place that is relaxing.  Get up and do a quiet or boring activity until you feel sleepy.  Avoid drinking any liquids before going to bed to help prevent waking up often to use the bathroom.  Where can you learn more?  Go to https://www.[employer].net/patiented  Enter J942 in the search box to learn more about \"Learning About Sleeping Well.\"  Current as of: July 31, 2024  Content Version: 14.4 2024-2025 [employer].   Care instructions adapted under license by your healthcare professional. If you have questions about a medical condition or this instruction, always ask your healthcare professional. [employer] disclaims any warranty or liability for your use of this information.       "

## 2025-03-18 NOTE — PROGRESS NOTES
Preventive Care Visit  [facility]  ALINA NORTON MD, Family Medicine  Mar 18, 2025      Assessment & Plan     (Z00.00) Encounter for Medicare annual wellness exam  (primary encounter diagnosis)  Comment: Stable  Plan: Pending labs    (J45.20) Mild intermittent asthma without complication  Comment: Chronic, stable  Plan: fluticasone (FLONASE) 50 MCG/ACT nasal spray            (F41.9) Anxiety  Comment: Chronic, stable  Plan: sertraline (ZOLOFT) 25 MG tablet            (I10) Hypertension goal BP (blood pressure) < 140/90  Comment: Chronic, stable  Plan: losartan (COZAAR) 100 MG tablet, cloNIDine         (CATAPRES) 0.1 MG tablet, atenolol (TENORMIN)         50 MG tablet, Comprehensive metabolic panel         (BMP + Alb, Alk Phos, ALT, AST, Total. Bili,         TP), CBC with platelets and differential            (M81.0) Age-related osteoporosis without current pathological fracture  Comment: Chronic, uncontrolled. Pt requesting letter for better accommodations for her living situation. Did recommend she see PT due to aspects of imbalance and can proceed with recommendations following an evaluation   Plan: Physical Therapy  Referral            (Z99.89) Dependence on walking stick  Comment: Chronic, uncontrolled. Pending PT  Plan: Physical Therapy  Referral            (R63.6,  Z68.1) Underweight (BMI < 18.5)  Comment: Chronic, uncontrolled. Due to medicare pt unable to see regular nutritionist and have to place referral for DM educator nutrition.   Plan: Adult Diabetes Education  Referral            I am utilizing AI dictation through [employer] to document the patient's history and physical examination. Please note that while the AI is designed to assist in capturing detailed information, there may be errors in the dictation. I will review and edit the content for accuracy before finalizing.      The longitudinal plan of care for the diagnosis(es)/condition(s) as  documented were addressed during this visit. Due to the added complexity in care, I will continue to support Holly in the subsequent management and with ongoing continuity of care.              Counseling  Appropriate preventive services were addressed with this patient via screening, questionnaire, or discussion as appropriate for fall prevention, nutrition, physical activity, Tobacco-use cessation, social engagement, weight loss and cognition.  Checklist reviewing preventive services available has been given to the patient.  Reviewed patient's diet, addressing concerns and/or questions.   She is at risk for lack of exercise and has been provided with information to increase physical activity for the benefit of her well-being.   She is at risk for psychosocial distress and has been provided with information to reduce risk.   Discussed possible causes of fatigue. Updated plan of care.  Patient reported difficulty with activities of daily living were addressed today.        Subjective   Holly is a 74 year old, presenting for the following:  Physical            HPI  History of Present Illness  Holly Reina is a 74 year old female with colorectal prolapse who presents with frequent bowel movements post-surgery. She is accompanied by a friend who lives in the same building.    She experiences frequent bowel movements following surgery for colorectal prolapse, with three to four times in the morning, two to three times in the afternoon, and additional times at night. Stools are described as 'mushy' and occasionally forming 'little balls.' There is occasional blood and yellow discharge, which she associates with the surgery.    She has experienced significant weight loss, dropping to 74 pounds post-surgery, but has since increased her weight to approximately 86-87 pounds. She follows a dietary plan provided post-surgery but finds it challenging to regain weight.    She lives in assisted living and reports being  advised against climbing, floor crawling, and other strenuous activities due to her physical condition. She mentions a recent incident where she almost lost her balance while climbing, resulting in a foot injury. She uses a cane for mobility and has concerns about her balance and ability to perform certain tasks.    She has osteoporosis, contributing to her concerns about bone health and mobility. She also has varicose veins and a hammertoe, which she has decided not to pursue treatment for at this time. She experiences pinched nerves in her lower back, causing occasional leg discomfort.             Advance Care Planning  Patient has a Health Care Directive on file  Advance care planning document is on file and is current.      3/18/2025   General Health   How would you rate your overall physical health? (!) FAIR   Feel stress (tense, anxious, or unable to sleep) To some extent   (!) STRESS CONCERN      3/18/2025   Nutrition   Diet: Regular (no restrictions)         3/18/2025   Exercise   Days per week of moderate/strenous exercise 2 days   Average minutes spent exercising at this level 20 min   (!) EXERCISE CONCERN      3/18/2025   Social Factors   Frequency of gathering with friends or relatives More than three times a week   Worry food won't last until get money to buy more No   Food not last or not have enough money for food? No   Do you have housing? (Housing is defined as stable permanent housing and does not include staying ouside in a car, in a tent, in an abandoned building, in an overnight shelter, or couch-surfing.) Yes   Are you worried about losing your housing? Yes   Lack of transportation? No   Unable to get utilities (heat,electricity)? No   Want help with housing or utility concern? (!) YES   (!) HOUSING CONCERN PRESENT      3/18/2025   Fall Risk   Fallen 2 or more times in the past year? No   Trouble with walking or balance? Yes   Gait Speed Test (Document in seconds) 4.6   Gait Speed Test  Interpretation Less than or equal to 5.00 seconds - PASS          3/18/2025   Activities of Daily Living- Home Safety   Needs help with the following daily activites Transportation   Safety concerns in the home None of the above         3/18/2025   Dental   Dentist two times every year? Yes         3/18/2025   Hearing Screening   Hearing concerns? None of the above         3/18/2025   Driving Risk Screening   Patient/family members have concerns about driving No         3/18/2025   General Alertness/Fatigue Screening   Have you been more tired than usual lately? (!) YES         3/18/2025   Urinary Incontinence Screening   Bothered by leaking urine in past 6 months No           Today's PHQ-2 Score:       3/18/2025     2:27 PM   PHQ-2 (  [employer])   Q1: Little interest or pleasure in doing things 0   Q2: Feeling down, depressed or hopeless 0   PHQ-2 Score 0    Q1: Little interest or pleasure in doing things Not at all   Q2: Feeling down, depressed or hopeless Not at all   PHQ-2 Score 0       Patient-reported           3/18/2025   Substance Use   Alcohol more than 3/day or more than 7/wk No   Do you have a current opioid prescription? No   How severe/bad is pain from 1 to 10? 5/10   Do you use any other substances recreationally? No     Social History     Tobacco Use    Smoking status: Former     Current packs/day: 0.00     Types: Cigarettes     Quit date: 1960     Years since quittin.4    Smokeless tobacco: Never   Vaping Use    Vaping status: Never Used   Substance Use Topics    Alcohol use: Yes     Comment: seldom    Drug use: No           2022   LAST FHS-7 RESULTS   1st degree relative breast or ovarian cancer No   Any relative bilateral breast cancer No   Any male have breast cancer No   Any ONE woman have BOTH breast AND ovarian cancer No   Any woman with breast cancer before 50yrs No   2 or more relatives with breast AND/OR ovarian cancer No   2 or more relatives with breast AND/OR bowel cancer  "No        Mammogram Screening - After age 74- determine frequency with patient based on health status, life expectancy and patient goals    ASCVD Risk   The 10-year ASCVD risk score (Shelly DK, et al., 2019) is: 25.1%    Values used to calculate the score:      Age: 74 years      Sex: Female      Is Non- : No      Diabetic: No      Tobacco smoker: No      Systolic Blood Pressure: 132 mmHg      Is BP treated: Yes      HDL Cholesterol: 83 mg/dL      Total Cholesterol: 207 mg/dL            Reviewed and updated as needed this visit by Provider                      Current providers sharing in care for this patient include:  Patient Care Team:  No Ref-Primary, Physician as PCP - Ruth Brenner MD as Assigned PCP    The following health maintenance items are reviewed in Epic and correct as of today:  Health Maintenance   Topic Date Due    ZOSTER IMMUNIZATION (1 of 2) Never done    LUNG CANCER SCREENING  04/03/2013    MEDICARE ANNUAL WELLNESS VISIT  07/18/2018    LIPID  06/10/2023    ANNUAL REVIEW OF HM ORDERS  06/10/2023    BMP  09/22/2023    ASTHMA ACTION PLAN  09/22/2023    ASTHMA CONTROL TEST  10/11/2023    RSV VACCINE (1 - 1-dose 75+ series) Never done    DEXA  07/01/2024    COVID-19 Vaccine (4 - 2024-25 season) 09/01/2024    GLUCOSE  09/22/2025    FALL RISK ASSESSMENT  03/18/2026    ADVANCE CARE PLANNING  04/07/2028    DTAP/TDAP/TD IMMUNIZATION (4 - Td or Tdap) 06/17/2032    HEPATITIS C SCREENING  Completed    PHQ-2 (once per calendar year)  Completed    INFLUENZA VACCINE  Completed    Pneumococcal Vaccine: 50+ Years  Completed    HPV IMMUNIZATION  Aged Out    MENINGITIS IMMUNIZATION  Aged Out    MAMMO SCREENING  Discontinued    COLORECTAL CANCER SCREENING  Discontinued        ROS: 10 point ROS neg other than the symptoms noted above in the HPI.       Objective    Exam  /86   Pulse 98   Temp 98.8  F (37.1  C) (Temporal)   Resp 16   Ht 1.53 m (5' 0.24\") " "  Wt 39.1 kg (86 lb 3.2 oz)   LMP  (LMP Unknown)   SpO2 100%   BMI 16.70 kg/m     Estimated body mass index is 16.7 kg/m  as calculated from the following:    Height as of this encounter: 1.53 m (5' 0.24\").    Weight as of this encounter: 39.1 kg (86 lb 3.2 oz).    Physical Exam  GENERAL: frail, alert and no distress  EYES: Eyes grossly normal to inspection, PERRL and conjunctivae and sclerae normal  Neck: No visible JVD or lymphadenopathy   RESP: symmetrical rise in chest   CV: No peripheral edema notable   MS: no gross musculoskeletal defects noted  SKIN: varicose veins present on b/l ankles and feet   PSYCH: mentation appears normal, affect normal/bright           3/18/2025   Mini Cog   Clock Draw Score 2 Normal   3 Item Recall 3 objects recalled   Mini Cog Total Score 5              Signed Electronically by: ALINA NORTON MD    "

## 2025-03-19 DIAGNOSIS — I10 HYPERTENSION GOAL BP (BLOOD PRESSURE) < 140/90: ICD-10-CM

## 2025-03-19 LAB
ALBUMIN SERPL BCG-MCNC: 4.1 G/DL (ref 3.5–5.2)
ALP SERPL-CCNC: 104 U/L (ref 40–150)
ALT SERPL W P-5'-P-CCNC: 13 U/L (ref 0–50)
ANION GAP SERPL CALCULATED.3IONS-SCNC: 11 MMOL/L (ref 7–15)
AST SERPL W P-5'-P-CCNC: 22 U/L (ref 0–45)
BILIRUB SERPL-MCNC: 0.2 MG/DL
BUN SERPL-MCNC: 9.4 MG/DL (ref 8–23)
CALCIUM SERPL-MCNC: 9.1 MG/DL (ref 8.8–10.4)
CHLORIDE SERPL-SCNC: 104 MMOL/L (ref 98–107)
CREAT SERPL-MCNC: 0.67 MG/DL (ref 0.51–0.95)
EGFRCR SERPLBLD CKD-EPI 2021: 90 ML/MIN/1.73M2
GLUCOSE SERPL-MCNC: 98 MG/DL (ref 70–99)
HCO3 SERPL-SCNC: 28 MMOL/L (ref 22–29)
POTASSIUM SERPL-SCNC: 4 MMOL/L (ref 3.4–5.3)
PROT SERPL-MCNC: 7.6 G/DL (ref 6.4–8.3)
SODIUM SERPL-SCNC: 143 MMOL/L (ref 135–145)

## 2025-03-19 RX ORDER — ATENOLOL 50 MG/1
TABLET ORAL
Qty: 24 TABLET | OUTPATIENT
Start: 2025-03-19

## 2025-05-21 ENCOUNTER — THERAPY VISIT (OUTPATIENT)
Dept: PHYSICAL THERAPY | Facility: CLINIC | Age: 75
End: 2025-05-21
Attending: STUDENT IN AN ORGANIZED HEALTH CARE EDUCATION/TRAINING PROGRAM
Payer: COMMERCIAL

## 2025-05-21 DIAGNOSIS — Z99.89 DEPENDENCE ON WALKING STICK: ICD-10-CM

## 2025-05-21 DIAGNOSIS — M81.0 AGE-RELATED OSTEOPOROSIS WITHOUT CURRENT PATHOLOGICAL FRACTURE: ICD-10-CM

## 2025-05-21 PROCEDURE — 97110 THERAPEUTIC EXERCISES: CPT | Mod: GP | Performed by: PHYSICAL THERAPIST

## 2025-05-21 PROCEDURE — 97162 PT EVAL MOD COMPLEX 30 MIN: CPT | Mod: GP | Performed by: PHYSICAL THERAPIST

## 2025-05-21 NOTE — PROGRESS NOTES
"PHYSICAL THERAPY EVALUATION  Type of Visit: Evaluation        Fall Risk Screen:  Have you fallen 2 or more times in the past year?: Yes  Have you fallen and had an injury in the past year?: Yes  Is patient receiving Physical Therapy Services?: Yes    Subjective         Presenting condition or subjective complaint: assessment of physical abilities  Date of onset: 03/18/24    Relevant medical history:   Anxiety, asthma, HTN  Dates & types of surgery: cleft palate    Prior diagnostic imaging/testing results: Other need assessment of what can do being asked to do unusual amounts of housework by apt manager   Prior therapy history for the same diagnosis, illness or injury: No      Living Environment  Social support: Alone   Type of home: Apartment/condo   Stairs to enter the home: No       Ramp: No   Stairs inside the home: No       Help at home: None  Equipment owned: Straight Cane; Walker with wheels     Employment: Not Applicable    Hobbies/Interests: cats and animals    Patient goals for therapy:  eval for accomodations    Pain assessment: Location: L shoulder and arm pain; low back;  dorsal surface R foot /Rating: \"like hell\"      Objective      Cognitive Status Examination  Orientation: Oriented to person, place and time   Level of Consciousness: Alert; anxious  Follows Commands and Answers Questions: Unable to follow multi step instructions, difficulty with navigation into room; multiple cues to complete single step instruction  Personal Safety and Judgement: At risk behaviors demonstrated  Memory: Intact    RANGE OF MOTION: L full shoulder ROM evident   STRENGTH: seated myotome testing as listed below  3+ L DF, HF 3+ bilat, KE 4/5  BED MOBILITY: Independent    TRANSFERS: Independent    GAIT:   Level of [place]: Independent  Assistive Device(s): Walker (four wheeled) outside, cane outside, no use of ad in home often  Gait Deviations: Base of support increased  Stride length decreased  High guard  Gait " Distance: limited to only in home and apts    SPECIAL TESTS  Lerma Balance Scale (BBS)     5 Times Sit-to-Stand (5TSTS)  16 sec   >12 sec strength impaired     Dynamic Gait Index (DGI)     Timed Up and Go (TUG) - sec 14 sec with 4ww and SPC; 13.4 without ad   >12.5 sec high fall risk   Single Leg Stance Right (sec) 1 <5 sec high fall risk   Single Leg Stance Left (sec) 1   Modified CTSIB Conditions (sec) Cond 1: 20  Cond 2: 2  Cond 4: 0  Cond 5 : 0  22/120 > high fall risk   Romberg  (sec)    Sharpened Romberg (sec)    30 Second Sit to Stand (reps/height) 9 reps  >impaired LE endurance   Mini-BESTest          High fall risk demonstrated throughout above testing. Recommended use of ad for all ambulation.     Assessment & Plan   CLINICAL IMPRESSIONS  Medical Diagnosis: osteoporosis; walking stick    Treatment Diagnosis: Force Production Deficit, sensory selection deficit   Impression/Assessment: Patient is a 74 year old female with balance complaints.  The following significant findings have been identified: Pain, Decreased strength, Impaired balance, Decreased proprioception, Impaired gait, Impaired muscle performance, and Decreased activity tolerance. These impairments interfere with their ability to perform household chores and community mobility as compared to previous level of function.     Clinical Decision Making (Complexity):  Clinical Presentation: Stable/Uncomplicated  Clinical Presentation Rationale: based on medical and personal factors listed in PT evaluation  Clinical Decision Making (Complexity): Moderate complexity    PLAN OF CARE  Treatment Interventions:  Interventions: Therapeutic Exercise, Self-Care/Home Management    Long Term Goals     PT Goal 1  Goal Identifier: HEP  Goal Description: Patient provided preventing falls at home packet and initiatl HEP to reducec fall risk and maintain independence.  Goal Progress: good understanding and agreement to read and complete provided resources  Target  Date: 05/21/25  Date Met: 05/21/25      Frequency of Treatment: 1x  Duration of Treatment: 1x only    Recommended Referrals to Other Professionals:   Education Assessment:   Learner/Method: Patient  Education Comments: Eval findings, age-matched scores, goals, POC    Risks and benefits of evaluation/treatment have been explained.   Patient/Family/caregiver agrees with Plan of Care.     Evaluation Time:     PT Prudencio, Moderate Complexity Minutes ([id number]): 30       Signing Clinician: REBEKAH MCKNIGHT [facility]                                                                                   OUTPATIENT PHYSICAL THERAPY      PLAN OF TREATMENT FOR OUTPATIENT REHABILITATION   Patient's Last Name, First Name, Holly Guillen YOB: 1950   Provider's Name   [facility]   Medical Record No.  [id number]     Onset Date: 03/18/24  Start of Care Date: 05/21/25     Medical Diagnosis:  osteoporosis; walking stick      PT Treatment Diagnosis:  Force Production Deficit, sensory selection deficit Plan of Treatment  Frequency/Duration: 1x/ 1x only    Certification date from 05/21/25 to 05/21/25         See note for plan of treatment details and functional goals     DIDI CONTRERAS, PT                         I CERTIFY THE NEED FOR THESE SERVICES FURNISHED UNDER        THIS PLAN OF TREATMENT AND WHILE UNDER MY CARE     (Physician attestation of this document indicates review and certification of the therapy plan).              Referring Provider:  Ruth Holley    Initial Assessment  See Epic Evaluation- Start of Care Date: 05/21/25